# Patient Record
Sex: FEMALE | Race: WHITE | NOT HISPANIC OR LATINO
[De-identification: names, ages, dates, MRNs, and addresses within clinical notes are randomized per-mention and may not be internally consistent; named-entity substitution may affect disease eponyms.]

---

## 2021-09-14 ENCOUNTER — RX RENEWAL (OUTPATIENT)
Age: 76
End: 2021-09-14

## 2021-11-26 ENCOUNTER — RX RENEWAL (OUTPATIENT)
Age: 76
End: 2021-11-26

## 2022-04-26 ENCOUNTER — RX RENEWAL (OUTPATIENT)
Age: 77
End: 2022-04-26

## 2022-07-11 ENCOUNTER — RX RENEWAL (OUTPATIENT)
Age: 77
End: 2022-07-11

## 2022-07-21 ENCOUNTER — APPOINTMENT (OUTPATIENT)
Dept: INTERNAL MEDICINE | Facility: CLINIC | Age: 77
End: 2022-07-21

## 2022-07-21 ENCOUNTER — NON-APPOINTMENT (OUTPATIENT)
Age: 77
End: 2022-07-21

## 2022-07-21 VITALS
HEIGHT: 61 IN | OXYGEN SATURATION: 96 % | WEIGHT: 156 LBS | SYSTOLIC BLOOD PRESSURE: 134 MMHG | RESPIRATION RATE: 18 BRPM | BODY MASS INDEX: 29.45 KG/M2 | DIASTOLIC BLOOD PRESSURE: 82 MMHG | TEMPERATURE: 98.4 F | HEART RATE: 63 BPM

## 2022-07-21 DIAGNOSIS — L81.4 OTHER MELANIN HYPERPIGMENTATION: ICD-10-CM

## 2022-07-21 DIAGNOSIS — Z82.0 FAMILY HISTORY OF EPILEPSY AND OTHER DISEASES OF THE NERVOUS SYSTEM: ICD-10-CM

## 2022-07-21 DIAGNOSIS — Z86.19 PERSONAL HISTORY OF OTHER INFECTIOUS AND PARASITIC DISEASES: ICD-10-CM

## 2022-07-21 DIAGNOSIS — Z82.3 FAMILY HISTORY OF STROKE: ICD-10-CM

## 2022-07-21 DIAGNOSIS — Z82.49 FAMILY HISTORY OF ISCHEMIC HEART DISEASE AND OTHER DISEASES OF THE CIRCULATORY SYSTEM: ICD-10-CM

## 2022-07-21 PROCEDURE — G0439: CPT

## 2022-07-21 PROCEDURE — 93000 ELECTROCARDIOGRAM COMPLETE: CPT

## 2022-07-21 PROCEDURE — 99214 OFFICE O/P EST MOD 30 MIN: CPT | Mod: 25

## 2022-07-21 RX ORDER — CYCLOSPORINE 0.5 MG/ML
0.05 EMULSION OPHTHALMIC
Qty: 60 | Refills: 0 | Status: ACTIVE | COMMUNITY
Start: 2022-02-10

## 2022-07-21 NOTE — HEALTH RISK ASSESSMENT
[Good] : ~his/her~  mood as  good [Never] : Never [No] : In the past 12 months have you used drugs other than those required for medical reasons? No [No falls in past year] : Patient reported no falls in the past year [0] : 2) Feeling down, depressed, or hopeless: Not at all (0) [PHQ-2 Negative - No further assessment needed] : PHQ-2 Negative - No further assessment needed [Patient reported mammogram was normal] : Patient reported mammogram was normal [Patient reported bone density results were abnormal] : Patient reported bone density results were abnormal [Patient declined colonoscopy] : Patient declined colonoscopy [None] : None [With Significant Other] : lives with significant other [Retired] : retired [] :  [Feels Safe at Home] : Feels safe at home [Fully functional (bathing, dressing, toileting, transferring, walking, feeding)] : Fully functional (bathing, dressing, toileting, transferring, walking, feeding) [Fully functional (using the telephone, shopping, preparing meals, housekeeping, doing laundry, using] : Fully functional and needs no help or supervision to perform IADLs (using the telephone, shopping, preparing meals, housekeeping, doing laundry, using transportation, managing medications and managing finances) [Reports changes in vision] : Reports changes in vision [Audit-CScore] : 0 [de-identified] : active [de-identified] : healthy [[id number]] : 0 [Change in mental status noted] : No change in mental status noted [Language] : denies difficulty with language [Behavior] : denies difficulty with behavior [Handling Complex Tasks] : denies difficulty handling complex tasks [Reasoning] : denies difficulty with reasoning [Reports changes in hearing] : Reports no changes in hearing [Reports changes in dental health] : Reports no changes in dental health [MammogramDate] : 8/2021 [PapSmearDate] : ELEAZAR [BoneDensityDate] : 2021 [BoneDensityComments] : osteoperosis [de-identified] : sees optho [AdvancecareDate] : 7/21/22

## 2022-07-21 NOTE — ASSESSMENT
[FreeTextEntry1] : HLD- stable, cont atorv\par HTN-stable, cont metoprolol\par Osteoperosis- cont fosamax, ca/d, rpt BD 1/2022\par Dry Eyes- restasis, sees optho\par Mac Degen- injections q5w, sees optho\par Hypothyroid-stable, cont synthroid , chk labs\par OA- conserv tx. \par Discussed healthy lifestyle, updated vaccinations, healthy diet, exercise, chk labs, discussed appropriate screening tests including colonoscopy, mammo, bone density and pap.\par Discussed the importance of screening for colon cancer. Reviewed screening reccomendations including colonoscopy, Cologuard and Fit DNA testing. I strongly encouraged colonoscopy as that is the best screening test to detect both colon cancer and polyps and is the gold standard.\par Discussed the importance and benefit of a healthy lifestyle including a heart healthy diet such as the Mediterranean diet and regular exercise to try to lose weight as well as 7 hours of sleep nightly and minimization of stress where possible as a way of decreasing heart risk and improving health\par

## 2022-07-21 NOTE — HISTORY OF PRESENT ILLNESS
[FreeTextEntry1] : cristina [de-identified] : dry eyes\par mac degen- gets injections q5w- [facility]. \par moderna x2, no h/o covid\par oa hands, \par Otherwise feels good. Appet, sleep, bms, voiding, mood all ok. .\par

## 2022-07-21 NOTE — PHYSICAL EXAM
[No Acute Distress] : no acute distress [Well Nourished] : well nourished [Well Developed] : well developed [Well-Appearing] : well-appearing [Normal Sclera/Conjunctiva] : normal sclera/conjunctiva [PERRL] : pupils equal round and reactive to light [EOMI] : extraocular movements intact [Normal Outer Ear/Nose] : the outer ears and nose were normal in appearance [Normal Oropharynx] : the oropharynx was normal [No JVD] : no jugular venous distention [No Lymphadenopathy] : no lymphadenopathy [Supple] : supple [Thyroid Normal, No Nodules] : the thyroid was normal and there were no nodules present [No Respiratory Distress] : no respiratory distress  [No Accessory Muscle Use] : no accessory muscle use [Clear to Auscultation] : lungs were clear to auscultation bilaterally [Normal Rate] : normal rate  [Regular Rhythm] : with a regular rhythm [Normal S1, S2] : normal S1 and S2 [No Murmur] : no murmur heard [No Carotid Bruits] : no carotid bruits [No Abdominal Bruit] : a ~M bruit was not heard ~T in the abdomen [No Varicosities] : no varicosities [Pedal Pulses Present] : the pedal pulses are present [No Edema] : there was no peripheral edema [No Palpable Aorta] : no palpable aorta [No Extremity Clubbing/Cyanosis] : no extremity clubbing/cyanosis [Normal Appearance] : normal in appearance [No Nipple Discharge] : no nipple discharge [No Axillary Lymphadenopathy] : no axillary lymphadenopathy [Soft] : abdomen soft [Non Tender] : non-tender [Non-distended] : non-distended [No Masses] : no abdominal mass palpated [No HSM] : no HSM [Normal Bowel Sounds] : normal bowel sounds [Normal Posterior Cervical Nodes] : no posterior cervical lymphadenopathy [Normal Anterior Cervical Nodes] : no anterior cervical lymphadenopathy [No CVA Tenderness] : no CVA  tenderness [No Spinal Tenderness] : no spinal tenderness [Grossly Normal Strength/Tone] : grossly normal strength/tone [No Rash] : no rash [Coordination Grossly Intact] : coordination grossly intact [No Focal Deficits] : no focal deficits [Normal Gait] : normal gait [Deep Tendon Reflexes (DTR)] : deep tendon reflexes were 2+ and symmetric [Normal Affect] : the affect was normal [Normal Insight/Judgement] : insight and judgment were intact [de-identified] : diffuse arthritic changes hands/knees [de-identified] : numerous solar lentigines

## 2022-07-22 ENCOUNTER — NON-APPOINTMENT (OUTPATIENT)
Age: 77
End: 2022-07-22

## 2022-07-22 LAB
25(OH)D3 SERPL-MCNC: 42.4 NG/ML
ALBUMIN SERPL ELPH-MCNC: 4.4 G/DL
ALP BLD-CCNC: 74 U/L
ALT SERPL-CCNC: 13 U/L
ANION GAP SERPL CALC-SCNC: 11 MMOL/L
AST SERPL-CCNC: 28 U/L
BASOPHILS # BLD AUTO: 0.05 K/UL
BASOPHILS NFR BLD AUTO: 0.7 %
BILIRUB SERPL-MCNC: 0.4 MG/DL
BUN SERPL-MCNC: 17 MG/DL
CALCIUM SERPL-MCNC: 9.8 MG/DL
CHLORIDE SERPL-SCNC: 104 MMOL/L
CHOLEST SERPL-MCNC: 186 MG/DL
CO2 SERPL-SCNC: 26 MMOL/L
CREAT SERPL-MCNC: 0.68 MG/DL
EGFR: 90 ML/MIN/1.73M2
EOSINOPHIL # BLD AUTO: 0.13 K/UL
EOSINOPHIL NFR BLD AUTO: 1.9 %
ESTIMATED AVERAGE GLUCOSE: 117 MG/DL
GLUCOSE SERPL-MCNC: 97 MG/DL
HBA1C MFR BLD HPLC: 5.7 %
HCT VFR BLD CALC: 41.4 %
HDLC SERPL-MCNC: 73 MG/DL
HGB BLD-MCNC: 12.9 G/DL
IMM GRANULOCYTES NFR BLD AUTO: 0.1 %
LDLC SERPL CALC-MCNC: 95 MG/DL
LYMPHOCYTES # BLD AUTO: 1.98 K/UL
LYMPHOCYTES NFR BLD AUTO: 28.7 %
MAN DIFF?: NORMAL
MCHC RBC-ENTMCNC: 31.1 PG
MCHC RBC-ENTMCNC: 31.2 GM/DL
MCV RBC AUTO: 99.8 FL
MONOCYTES # BLD AUTO: 0.85 K/UL
MONOCYTES NFR BLD AUTO: 12.3 %
NEUTROPHILS # BLD AUTO: 3.89 K/UL
NEUTROPHILS NFR BLD AUTO: 56.3 %
NONHDLC SERPL-MCNC: 113 MG/DL
PLATELET # BLD AUTO: 283 K/UL
POTASSIUM SERPL-SCNC: 4.4 MMOL/L
PROT SERPL-MCNC: 7.1 G/DL
RBC # BLD: 4.15 M/UL
RBC # FLD: 13.1 %
SODIUM SERPL-SCNC: 141 MMOL/L
T3 SERPL-MCNC: 108 NG/DL
T4 FREE SERPL-MCNC: 1.2 NG/DL
TRIGL SERPL-MCNC: 88 MG/DL
TSH SERPL-ACNC: 2.3 UIU/ML
VIT B12 SERPL-MCNC: 631 PG/ML
WBC # FLD AUTO: 6.91 K/UL

## 2022-10-17 ENCOUNTER — APPOINTMENT (OUTPATIENT)
Dept: INTERNAL MEDICINE | Facility: CLINIC | Age: 77
End: 2022-10-17

## 2022-10-17 VITALS
WEIGHT: 140 LBS | HEIGHT: 61 IN | DIASTOLIC BLOOD PRESSURE: 100 MMHG | RESPIRATION RATE: 18 BRPM | SYSTOLIC BLOOD PRESSURE: 160 MMHG | BODY MASS INDEX: 26.43 KG/M2 | OXYGEN SATURATION: 96 % | TEMPERATURE: 98.7 F | HEART RATE: 104 BPM

## 2022-10-17 DIAGNOSIS — J01.90 ACUTE SINUSITIS, UNSPECIFIED: ICD-10-CM

## 2022-10-17 PROCEDURE — 99213 OFFICE O/P EST LOW 20 MIN: CPT

## 2022-10-18 ENCOUNTER — NON-APPOINTMENT (OUTPATIENT)
Age: 77
End: 2022-10-18

## 2022-10-18 PROBLEM — J01.90 ACUTE SINUSITIS: Status: RESOLVED | Noted: 2022-10-17 | Resolved: 2022-11-16

## 2022-10-18 LAB
RAPID RVP RESULT: DETECTED
RV+EV RNA SPEC QL NAA+PROBE: DETECTED
SARS-COV-2 RNA PNL RESP NAA+PROBE: NOT DETECTED

## 2022-10-18 NOTE — HISTORY OF PRESENT ILLNESS
[Patient presents to the office today for COVID-19 evaluation and testing.] : Patient presents to the office today for COVID-19 evaluation and testing. [] : no dizziness on standing [None Known] : none known [Age >= 60 years] : age >= 60 years [None] : none [Clear] : clear [Normal O2 sat at rest] : normal O2 sat at rest [Grossly normal, interacts, not tired or weak] : grossly normal, interacts, not tired or weak [COVID-19 testing ordered and specimen obtained] : COVID-19 testing ordered and specimen obtained [FreeTextEntry1] : 76 year old female presents herself today for a sick visit.\par Hx of \par \par Patient is here today for sinus pressure on her head.\par She is fully vaccinated for covid-19.\par Otherwise patient is stable.\par \par Voices no further complaints.\par ROS as documented below.\par Denies fever,cough,chills,body aches and SOB.

## 2022-11-30 ENCOUNTER — OFFICE (OUTPATIENT)
Dept: URBAN - METROPOLITAN AREA CLINIC 77 | Facility: CLINIC | Age: 77
Setting detail: OPHTHALMOLOGY
End: 2022-11-30
Payer: MEDICARE

## 2022-11-30 DIAGNOSIS — H35.3221: ICD-10-CM

## 2022-11-30 DIAGNOSIS — H40.1111: ICD-10-CM

## 2022-11-30 DIAGNOSIS — H35.3211: ICD-10-CM

## 2022-11-30 DIAGNOSIS — H35.3231: ICD-10-CM

## 2022-11-30 DIAGNOSIS — H43.811: ICD-10-CM

## 2022-11-30 PROCEDURE — 99213 OFFICE O/P EST LOW 20 MIN: CPT | Performed by: OPHTHALMOLOGY

## 2022-11-30 PROCEDURE — 67028 INJECTION EYE DRUG: CPT | Performed by: OPHTHALMOLOGY

## 2022-11-30 PROCEDURE — 92250 FUNDUS PHOTOGRAPHY W/I&R: CPT | Performed by: OPHTHALMOLOGY

## 2022-11-30 ASSESSMENT — SPHEQUIV_DERIVED
OS_SPHEQUIV: -1.75
OS_SPHEQUIV: -1.75
OD_SPHEQUIV: -1.375

## 2022-11-30 ASSESSMENT — REFRACTION_CURRENTRX
OD_SPHERE: +0.25
OS_CYLINDER: -0.75
OS_OVR_VA: 20/
OD_AXIS: 105
OD_CYLINDER: -1.25
OS_AXIS: 77
OS_ADD: +3.00
OD_ADD: +3.00
OD_OVR_VA: 20/
OS_SPHERE: -1.25

## 2022-11-30 ASSESSMENT — REFRACTION_MANIFEST
OD_CYLINDER: -1.25
OD_SPHERE: PL
OS_SPHERE: -1.25
OS_ADD: +3.00
OS_CYLINDER: -1.00
OD_VA1: 20/30
OD_ADD: +3.00
OS_AXIS: 80
OD_AXIS: 105
OS_VA1: 20/30

## 2022-11-30 ASSESSMENT — REFRACTION_AUTOREFRACTION
OS_SPHERE: -1.25
OD_AXIS: 105
OD_SPHERE: -0.75
OS_CYLINDER: -1.00
OD_CYLINDER: -1.25
OS_AXIS: 51

## 2022-11-30 ASSESSMENT — VISUAL ACUITY
OD_BCVA: 20/30+
OS_BCVA: 20/30-

## 2022-11-30 ASSESSMENT — TONOMETRY
OD_IOP_MMHG: 15
OS_IOP_MMHG: 12

## 2022-11-30 ASSESSMENT — CONFRONTATIONAL VISUAL FIELD TEST (CVF)
OD_FINDINGS: FULL
OS_FINDINGS: FULL

## 2022-12-12 ENCOUNTER — OFFICE (OUTPATIENT)
Dept: URBAN - METROPOLITAN AREA CLINIC 77 | Facility: CLINIC | Age: 77
Setting detail: OPHTHALMOLOGY
End: 2022-12-12
Payer: MEDICARE

## 2022-12-12 DIAGNOSIS — H35.3221: ICD-10-CM

## 2022-12-12 DIAGNOSIS — H40.1111: ICD-10-CM

## 2022-12-12 DIAGNOSIS — H35.3211: ICD-10-CM

## 2022-12-12 PROCEDURE — 99213 OFFICE O/P EST LOW 20 MIN: CPT | Performed by: OPHTHALMOLOGY

## 2022-12-12 PROCEDURE — 92134 CPTRZ OPH DX IMG PST SGM RTA: CPT | Performed by: OPHTHALMOLOGY

## 2022-12-12 ASSESSMENT — KERATOMETRY
OD_K2POWER_DIOPTERS: 47.00
OD_AXISANGLE_DEGREES: 34
OD_K1POWER_DIOPTERS: 45.75
OS_K1POWER_DIOPTERS: 46.25
OS_K2POWER_DIOPTERS: 47.25
OS_AXISANGLE_DEGREES: 134

## 2022-12-12 ASSESSMENT — REFRACTION_AUTOREFRACTION
OS_AXIS: 51
OD_CYLINDER: -1.25
OD_SPHERE: -0.75
OD_AXIS: 105
OS_SPHERE: -1.25
OS_CYLINDER: -1.00

## 2022-12-12 ASSESSMENT — REFRACTION_CURRENTRX
OS_OVR_VA: 20/
OS_ADD: +3.00
OD_OVR_VA: 20/
OD_ADD: +3.00
OD_CYLINDER: -1.25
OS_AXIS: 77
OD_AXIS: 105
OS_SPHERE: -1.25
OS_CYLINDER: -0.75
OD_SPHERE: +0.25

## 2022-12-12 ASSESSMENT — AXIALLENGTH_DERIVED
OS_AL: 23.0908
OS_AL: 23.0908
OD_AL: 23.0828

## 2022-12-12 ASSESSMENT — REFRACTION_MANIFEST
OD_SPHERE: PL
OD_CYLINDER: -1.25
OS_CYLINDER: -1.00
OS_ADD: +3.00
OS_VA1: 20/30
OD_ADD: +3.00
OS_AXIS: 80
OD_VA1: 20/30
OD_AXIS: 105
OS_SPHERE: -1.25

## 2022-12-12 ASSESSMENT — VISUAL ACUITY
OD_BCVA: 20/30
OS_BCVA: 20/30-

## 2022-12-12 ASSESSMENT — CONFRONTATIONAL VISUAL FIELD TEST (CVF)
OD_FINDINGS: FULL
OS_FINDINGS: FULL

## 2022-12-12 ASSESSMENT — SPHEQUIV_DERIVED
OD_SPHEQUIV: -1.375
OS_SPHEQUIV: -1.75
OS_SPHEQUIV: -1.75

## 2022-12-27 ENCOUNTER — OFFICE (OUTPATIENT)
Dept: URBAN - METROPOLITAN AREA CLINIC 77 | Facility: CLINIC | Age: 77
Setting detail: OPHTHALMOLOGY
End: 2022-12-27
Payer: MEDICARE

## 2022-12-27 DIAGNOSIS — H35.3211: ICD-10-CM

## 2022-12-27 DIAGNOSIS — H35.3221: ICD-10-CM

## 2022-12-27 DIAGNOSIS — H40.1111: ICD-10-CM

## 2022-12-27 DIAGNOSIS — H35.3231: ICD-10-CM

## 2022-12-27 DIAGNOSIS — H43.811: ICD-10-CM

## 2022-12-27 PROCEDURE — 92250 FUNDUS PHOTOGRAPHY W/I&R: CPT | Performed by: OPHTHALMOLOGY

## 2022-12-27 PROCEDURE — 67028 INJECTION EYE DRUG: CPT | Performed by: OPHTHALMOLOGY

## 2022-12-27 ASSESSMENT — CONFRONTATIONAL VISUAL FIELD TEST (CVF)
OD_FINDINGS: FULL
OS_FINDINGS: FULL

## 2022-12-27 ASSESSMENT — REFRACTION_CURRENTRX
OS_CYLINDER: -0.75
OS_SPHERE: -1.25
OS_AXIS: 77
OS_ADD: +3.00
OS_OVR_VA: 20/
OD_OVR_VA: 20/
OD_SPHERE: +0.25
OD_AXIS: 105
OD_CYLINDER: -1.25
OD_ADD: +3.00

## 2022-12-27 ASSESSMENT — REFRACTION_MANIFEST
OD_CYLINDER: -1.25
OD_ADD: +3.00
OS_AXIS: 80
OS_VA1: 20/30
OD_VA1: 20/30
OS_CYLINDER: -1.00
OD_AXIS: 105
OD_SPHERE: PL
OS_ADD: +3.00
OS_SPHERE: -1.25

## 2022-12-27 ASSESSMENT — VISUAL ACUITY
OD_BCVA: 20/30
OS_BCVA: 20/30-

## 2022-12-27 ASSESSMENT — AXIALLENGTH_DERIVED
OS_AL: 23.0908
OD_AL: 23.0828
OS_AL: 23.0908

## 2022-12-27 ASSESSMENT — KERATOMETRY
OS_AXISANGLE_DEGREES: 134
OS_K1POWER_DIOPTERS: 46.25
OD_AXISANGLE_DEGREES: 34
OD_K1POWER_DIOPTERS: 45.75
OS_K2POWER_DIOPTERS: 47.25
OD_K2POWER_DIOPTERS: 47.00

## 2022-12-27 ASSESSMENT — SPHEQUIV_DERIVED
OS_SPHEQUIV: -1.75
OD_SPHEQUIV: -1.375
OS_SPHEQUIV: -1.75

## 2022-12-27 ASSESSMENT — REFRACTION_AUTOREFRACTION
OS_AXIS: 51
OD_AXIS: 105
OS_SPHERE: -1.25
OD_SPHERE: -0.75
OS_CYLINDER: -1.00
OD_CYLINDER: -1.25

## 2023-02-08 ENCOUNTER — OFFICE (OUTPATIENT)
Dept: URBAN - METROPOLITAN AREA CLINIC 77 | Facility: CLINIC | Age: 78
Setting detail: OPHTHALMOLOGY
End: 2023-02-08
Payer: MEDICARE

## 2023-02-08 DIAGNOSIS — H35.3211: ICD-10-CM

## 2023-02-08 DIAGNOSIS — H35.3221: ICD-10-CM

## 2023-02-08 DIAGNOSIS — H40.1111: ICD-10-CM

## 2023-02-08 PROCEDURE — 67028 INJECTION EYE DRUG: CPT | Performed by: OPHTHALMOLOGY

## 2023-02-08 PROCEDURE — 99213 OFFICE O/P EST LOW 20 MIN: CPT | Performed by: OPHTHALMOLOGY

## 2023-02-08 PROCEDURE — 92250 FUNDUS PHOTOGRAPHY W/I&R: CPT | Performed by: OPHTHALMOLOGY

## 2023-02-08 ASSESSMENT — CONFRONTATIONAL VISUAL FIELD TEST (CVF)
OD_FINDINGS: FULL
OS_FINDINGS: FULL

## 2023-02-16 ASSESSMENT — REFRACTION_MANIFEST
OS_VA1: 20/30
OS_SPHERE: -1.25
OS_ADD: +3.00
OD_AXIS: 105
OD_ADD: +3.00
OD_SPHERE: PL
OD_VA1: 20/30
OS_CYLINDER: -1.00
OS_AXIS: 80
OD_CYLINDER: -1.25

## 2023-02-16 ASSESSMENT — KERATOMETRY
OS_AXISANGLE_DEGREES: 134
OS_K2POWER_DIOPTERS: 47.25
OS_K1POWER_DIOPTERS: 46.25
OD_AXISANGLE_DEGREES: 34
OD_K2POWER_DIOPTERS: 47.00
OD_K1POWER_DIOPTERS: 45.75

## 2023-02-16 ASSESSMENT — REFRACTION_CURRENTRX
OD_SPHERE: +0.25
OD_ADD: +3.00
OD_OVR_VA: 20/
OS_SPHERE: -1.25
OS_OVR_VA: 20/
OD_CYLINDER: -1.25
OS_AXIS: 77
OD_AXIS: 105
OS_CYLINDER: -0.75
OS_ADD: +3.00

## 2023-02-16 ASSESSMENT — SPHEQUIV_DERIVED
OS_SPHEQUIV: -1.75
OS_SPHEQUIV: -1.75
OD_SPHEQUIV: -1.375

## 2023-02-16 ASSESSMENT — REFRACTION_AUTOREFRACTION
OD_AXIS: 105
OD_SPHERE: -0.75
OS_CYLINDER: -1.00
OS_AXIS: 51
OS_SPHERE: -1.25
OD_CYLINDER: -1.25

## 2023-02-16 ASSESSMENT — AXIALLENGTH_DERIVED
OS_AL: 23.0908
OD_AL: 23.0828
OS_AL: 23.0908

## 2023-02-16 ASSESSMENT — VISUAL ACUITY
OD_BCVA: 20/30
OS_BCVA: 20/30-

## 2023-03-01 ENCOUNTER — RX RENEWAL (OUTPATIENT)
Age: 78
End: 2023-03-01

## 2023-03-23 ENCOUNTER — OFFICE (OUTPATIENT)
Dept: URBAN - METROPOLITAN AREA CLINIC 77 | Facility: CLINIC | Age: 78
Setting detail: OPHTHALMOLOGY
End: 2023-03-23
Payer: MEDICARE

## 2023-03-23 DIAGNOSIS — H35.3211: ICD-10-CM

## 2023-03-23 DIAGNOSIS — H43.811: ICD-10-CM

## 2023-03-23 DIAGNOSIS — H35.3231: ICD-10-CM

## 2023-03-23 DIAGNOSIS — H25.13: ICD-10-CM

## 2023-03-23 DIAGNOSIS — H35.3221: ICD-10-CM

## 2023-03-23 DIAGNOSIS — H40.1111: ICD-10-CM

## 2023-03-23 PROCEDURE — 67028 INJECTION EYE DRUG: CPT | Performed by: OPHTHALMOLOGY

## 2023-03-23 PROCEDURE — 92250 FUNDUS PHOTOGRAPHY W/I&R: CPT | Performed by: OPHTHALMOLOGY

## 2023-03-23 PROCEDURE — 99213 OFFICE O/P EST LOW 20 MIN: CPT | Performed by: OPHTHALMOLOGY

## 2023-03-23 ASSESSMENT — REFRACTION_AUTOREFRACTION
OS_CYLINDER: -1.00
OD_AXIS: 105
OD_SPHERE: -0.75
OS_SPHERE: -1.25
OS_AXIS: 51
OD_CYLINDER: -1.25

## 2023-03-23 ASSESSMENT — SPHEQUIV_DERIVED
OS_SPHEQUIV: -1.75
OS_SPHEQUIV: -1.75
OD_SPHEQUIV: -1.375

## 2023-03-23 ASSESSMENT — REFRACTION_MANIFEST
OS_AXIS: 80
OD_CYLINDER: -1.25
OS_SPHERE: -1.25
OS_CYLINDER: -1.00
OD_VA1: 20/30
OS_ADD: +3.00
OD_AXIS: 105
OD_ADD: +3.00
OS_VA1: 20/30
OD_SPHERE: PL

## 2023-03-23 ASSESSMENT — REFRACTION_CURRENTRX
OS_CYLINDER: -0.75
OD_CYLINDER: -1.25
OD_OVR_VA: 20/
OD_ADD: +3.00
OD_SPHERE: +0.25
OD_AXIS: 105
OS_AXIS: 77
OS_OVR_VA: 20/
OS_ADD: +3.00
OS_SPHERE: -1.25

## 2023-03-23 ASSESSMENT — KERATOMETRY
OD_K1POWER_DIOPTERS: 45.75
OD_K2POWER_DIOPTERS: 47.00
OS_K1POWER_DIOPTERS: 46.25
OD_AXISANGLE_DEGREES: 34
OS_K2POWER_DIOPTERS: 47.25
OS_AXISANGLE_DEGREES: 134

## 2023-03-23 ASSESSMENT — AXIALLENGTH_DERIVED
OS_AL: 23.0908
OS_AL: 23.0908
OD_AL: 23.0828

## 2023-03-23 ASSESSMENT — VISUAL ACUITY
OS_BCVA: 20/30-
OD_BCVA: 20/30

## 2023-03-23 ASSESSMENT — CONFRONTATIONAL VISUAL FIELD TEST (CVF)
OS_FINDINGS: FULL
OD_FINDINGS: FULL

## 2023-04-24 ENCOUNTER — OFFICE (OUTPATIENT)
Dept: URBAN - METROPOLITAN AREA CLINIC 77 | Facility: CLINIC | Age: 78
Setting detail: OPHTHALMOLOGY
End: 2023-04-24
Payer: MEDICARE

## 2023-04-24 DIAGNOSIS — H35.3231: ICD-10-CM

## 2023-04-24 DIAGNOSIS — H43.391: ICD-10-CM

## 2023-04-24 DIAGNOSIS — H40.1111: ICD-10-CM

## 2023-04-24 DIAGNOSIS — H43.811: ICD-10-CM

## 2023-04-24 DIAGNOSIS — H25.13: ICD-10-CM

## 2023-04-24 PROCEDURE — 92250 FUNDUS PHOTOGRAPHY W/I&R: CPT | Performed by: OPHTHALMOLOGY

## 2023-04-24 PROCEDURE — 76514 ECHO EXAM OF EYE THICKNESS: CPT | Performed by: OPHTHALMOLOGY

## 2023-04-24 PROCEDURE — 99213 OFFICE O/P EST LOW 20 MIN: CPT | Performed by: OPHTHALMOLOGY

## 2023-04-24 PROCEDURE — 67028 INJECTION EYE DRUG: CPT | Performed by: OPHTHALMOLOGY

## 2023-04-24 ASSESSMENT — REFRACTION_AUTOREFRACTION
OS_AXIS: 51
OD_AXIS: 105
OD_CYLINDER: -1.25
OS_SPHERE: -1.25
OS_CYLINDER: -1.00
OD_SPHERE: -0.75

## 2023-04-24 ASSESSMENT — KERATOMETRY
OD_K1POWER_DIOPTERS: 45.75
OS_AXISANGLE_DEGREES: 134
OD_AXISANGLE_DEGREES: 34
OS_K1POWER_DIOPTERS: 46.25
OD_K2POWER_DIOPTERS: 47.00
OS_K2POWER_DIOPTERS: 47.25

## 2023-04-24 ASSESSMENT — REFRACTION_CURRENTRX
OD_SPHERE: +0.25
OD_ADD: +3.00
OS_OVR_VA: 20/
OD_OVR_VA: 20/
OS_AXIS: 77
OS_SPHERE: -1.25
OD_AXIS: 105
OS_ADD: +3.00
OS_CYLINDER: -0.75
OD_CYLINDER: -1.25

## 2023-04-24 ASSESSMENT — CONFRONTATIONAL VISUAL FIELD TEST (CVF)
OS_FINDINGS: FULL
OD_FINDINGS: FULL

## 2023-04-24 ASSESSMENT — AXIALLENGTH_DERIVED
OD_AL: 23.0828
OS_AL: 23.0908
OS_AL: 23.0908

## 2023-04-24 ASSESSMENT — REFRACTION_MANIFEST
OS_ADD: +3.00
OS_CYLINDER: -1.00
OD_VA1: 20/30
OD_SPHERE: PL
OD_ADD: +3.00
OD_AXIS: 105
OD_CYLINDER: -1.25
OS_SPHERE: -1.25
OS_VA1: 20/30
OS_AXIS: 80

## 2023-04-24 ASSESSMENT — SPHEQUIV_DERIVED
OS_SPHEQUIV: -1.75
OS_SPHEQUIV: -1.75
OD_SPHEQUIV: -1.375

## 2023-04-24 ASSESSMENT — VISUAL ACUITY
OS_BCVA: 20/30-
OD_BCVA: 20/30

## 2023-06-12 ENCOUNTER — OFFICE (OUTPATIENT)
Dept: URBAN - METROPOLITAN AREA CLINIC 77 | Facility: CLINIC | Age: 78
Setting detail: OPHTHALMOLOGY
End: 2023-06-12
Payer: MEDICARE

## 2023-06-12 DIAGNOSIS — H43.391: ICD-10-CM

## 2023-06-12 DIAGNOSIS — H35.3231: ICD-10-CM

## 2023-06-12 DIAGNOSIS — H35.3211: ICD-10-CM

## 2023-06-12 DIAGNOSIS — H43.811: ICD-10-CM

## 2023-06-12 DIAGNOSIS — H35.3221: ICD-10-CM

## 2023-06-12 DIAGNOSIS — H25.13: ICD-10-CM

## 2023-06-12 DIAGNOSIS — H40.1111: ICD-10-CM

## 2023-06-12 PROCEDURE — 99213 OFFICE O/P EST LOW 20 MIN: CPT | Performed by: OPHTHALMOLOGY

## 2023-06-12 PROCEDURE — 67028 INJECTION EYE DRUG: CPT | Performed by: OPHTHALMOLOGY

## 2023-06-12 PROCEDURE — 92250 FUNDUS PHOTOGRAPHY W/I&R: CPT | Performed by: OPHTHALMOLOGY

## 2023-06-12 ASSESSMENT — REFRACTION_CURRENTRX
OD_ADD: +3.00
OS_CYLINDER: -0.75
OD_CYLINDER: -1.25
OD_OVR_VA: 20/
OS_AXIS: 77
OS_ADD: +3.00
OD_SPHERE: +0.25
OS_SPHERE: -1.25
OD_AXIS: 105
OS_OVR_VA: 20/

## 2023-06-12 ASSESSMENT — REFRACTION_MANIFEST
OS_AXIS: 80
OD_AXIS: 105
OD_ADD: +3.00
OS_CYLINDER: -1.00
OS_VA1: 20/30
OD_CYLINDER: -1.25
OD_SPHERE: PL
OS_ADD: +3.00
OD_VA1: 20/30
OS_SPHERE: -1.25

## 2023-06-12 ASSESSMENT — KERATOMETRY
OS_K2POWER_DIOPTERS: 47.25
OD_K1POWER_DIOPTERS: 45.75
OD_AXISANGLE_DEGREES: 34
OS_AXISANGLE_DEGREES: 134
OD_K2POWER_DIOPTERS: 47.00
OS_K1POWER_DIOPTERS: 46.25

## 2023-06-12 ASSESSMENT — SPHEQUIV_DERIVED
OS_SPHEQUIV: -1.75
OD_SPHEQUIV: -1.375
OS_SPHEQUIV: -1.75

## 2023-06-12 ASSESSMENT — REFRACTION_AUTOREFRACTION
OD_CYLINDER: -1.25
OS_AXIS: 51
OD_AXIS: 105
OS_SPHERE: -1.25
OD_SPHERE: -0.75
OS_CYLINDER: -1.00

## 2023-06-12 ASSESSMENT — AXIALLENGTH_DERIVED
OD_AL: 23.0828
OS_AL: 23.0908
OS_AL: 23.0908

## 2023-06-12 ASSESSMENT — CONFRONTATIONAL VISUAL FIELD TEST (CVF)
OD_FINDINGS: FULL
OS_FINDINGS: FULL

## 2023-06-12 ASSESSMENT — VISUAL ACUITY
OD_BCVA: 20/30
OS_BCVA: 20/30-

## 2023-06-23 ENCOUNTER — RX RENEWAL (OUTPATIENT)
Age: 78
End: 2023-06-23

## 2023-07-03 ENCOUNTER — RX RENEWAL (OUTPATIENT)
Age: 78
End: 2023-07-03

## 2023-07-03 RX ORDER — METOPROLOL SUCCINATE 50 MG/1
50 TABLET, EXTENDED RELEASE ORAL
Qty: 90 | Refills: 3 | Status: ACTIVE | COMMUNITY
Start: 2022-04-18 | End: 1900-01-01

## 2023-07-03 RX ORDER — ATORVASTATIN CALCIUM 20 MG/1
20 TABLET, FILM COATED ORAL
Qty: 90 | Refills: 3 | Status: ACTIVE | COMMUNITY
Start: 2022-04-18 | End: 1900-01-01

## 2023-07-17 ENCOUNTER — OFFICE (OUTPATIENT)
Dept: URBAN - METROPOLITAN AREA CLINIC 77 | Facility: CLINIC | Age: 78
Setting detail: OPHTHALMOLOGY
End: 2023-07-17
Payer: MEDICARE

## 2023-07-17 DIAGNOSIS — H35.3221: ICD-10-CM

## 2023-07-17 DIAGNOSIS — H40.1111: ICD-10-CM

## 2023-07-17 DIAGNOSIS — H43.811: ICD-10-CM

## 2023-07-17 DIAGNOSIS — H25.13: ICD-10-CM

## 2023-07-17 DIAGNOSIS — H35.3211: ICD-10-CM

## 2023-07-17 DIAGNOSIS — H35.3231: ICD-10-CM

## 2023-07-17 PROCEDURE — 67028 INJECTION EYE DRUG: CPT | Performed by: OPHTHALMOLOGY

## 2023-07-17 PROCEDURE — 92250 FUNDUS PHOTOGRAPHY W/I&R: CPT | Performed by: OPHTHALMOLOGY

## 2023-07-17 PROCEDURE — 99213 OFFICE O/P EST LOW 20 MIN: CPT | Performed by: OPHTHALMOLOGY

## 2023-07-17 ASSESSMENT — KERATOMETRY
OD_K1POWER_DIOPTERS: 45.75
OD_K2POWER_DIOPTERS: 47.00
OS_K1POWER_DIOPTERS: 46.25
OS_AXISANGLE_DEGREES: 134
OS_K2POWER_DIOPTERS: 47.25
OD_AXISANGLE_DEGREES: 34

## 2023-07-17 ASSESSMENT — REFRACTION_CURRENTRX
OD_CYLINDER: -1.25
OS_ADD: +3.00
OD_OVR_VA: 20/
OS_OVR_VA: 20/
OS_SPHERE: -1.25
OD_SPHERE: +0.25
OD_AXIS: 105
OS_AXIS: 77
OD_ADD: +3.00
OS_CYLINDER: -0.75

## 2023-07-17 ASSESSMENT — AXIALLENGTH_DERIVED
OS_AL: 23.0908
OD_AL: 23.0828
OS_AL: 23.0908

## 2023-07-17 ASSESSMENT — VISUAL ACUITY
OD_BCVA: 20/30
OS_BCVA: 20/30-

## 2023-07-17 ASSESSMENT — CONFRONTATIONAL VISUAL FIELD TEST (CVF)
OD_FINDINGS: FULL
OS_FINDINGS: FULL

## 2023-07-17 ASSESSMENT — REFRACTION_MANIFEST
OS_AXIS: 80
OD_SPHERE: PL
OD_AXIS: 105
OS_VA1: 20/30
OD_CYLINDER: -1.25
OS_CYLINDER: -1.00
OD_VA1: 20/30
OS_SPHERE: -1.25
OS_ADD: +3.00
OD_ADD: +3.00

## 2023-07-17 ASSESSMENT — REFRACTION_AUTOREFRACTION
OS_AXIS: 51
OD_AXIS: 105
OS_CYLINDER: -1.00
OS_SPHERE: -1.25
OD_SPHERE: -0.75
OD_CYLINDER: -1.25

## 2023-07-17 ASSESSMENT — SPHEQUIV_DERIVED
OS_SPHEQUIV: -1.75
OD_SPHEQUIV: -1.375
OS_SPHEQUIV: -1.75

## 2023-07-27 ENCOUNTER — NON-APPOINTMENT (OUTPATIENT)
Age: 78
End: 2023-07-27

## 2023-07-27 ENCOUNTER — APPOINTMENT (OUTPATIENT)
Dept: INTERNAL MEDICINE | Facility: CLINIC | Age: 78
End: 2023-07-27
Payer: MEDICARE

## 2023-07-27 VITALS
TEMPERATURE: 98 F | RESPIRATION RATE: 18 BRPM | HEIGHT: 61 IN | HEART RATE: 77 BPM | SYSTOLIC BLOOD PRESSURE: 134 MMHG | WEIGHT: 160.4 LBS | DIASTOLIC BLOOD PRESSURE: 88 MMHG | BODY MASS INDEX: 30.29 KG/M2 | OXYGEN SATURATION: 97 %

## 2023-07-27 DIAGNOSIS — Z00.00 ENCOUNTER FOR GENERAL ADULT MEDICAL EXAMINATION W/OUT ABNORMAL FINDINGS: ICD-10-CM

## 2023-07-27 PROCEDURE — G0439: CPT

## 2023-07-27 PROCEDURE — 99214 OFFICE O/P EST MOD 30 MIN: CPT | Mod: 25

## 2023-07-27 PROCEDURE — 93000 ELECTROCARDIOGRAM COMPLETE: CPT

## 2023-07-27 RX ORDER — AMOXICILLIN AND CLAVULANATE POTASSIUM 875; 125 MG/1; MG/1
875-125 TABLET, COATED ORAL
Qty: 14 | Refills: 0 | Status: DISCONTINUED | COMMUNITY
Start: 2022-10-17 | End: 2023-07-27

## 2023-07-28 LAB
25(OH)D3 SERPL-MCNC: 37.7 NG/ML
ALBUMIN SERPL ELPH-MCNC: 4.7 G/DL
ALP BLD-CCNC: 80 U/L
ALT SERPL-CCNC: 16 U/L
ANION GAP SERPL CALC-SCNC: 12 MMOL/L
APPEARANCE: CLEAR
AST SERPL-CCNC: 26 U/L
BACTERIA: NEGATIVE /HPF
BILIRUB SERPL-MCNC: 0.7 MG/DL
BILIRUBIN URINE: NEGATIVE
BLOOD URINE: NEGATIVE
BUN SERPL-MCNC: 12 MG/DL
CALCIUM SERPL-MCNC: 9.7 MG/DL
CAST: 0 /LPF
CHLORIDE SERPL-SCNC: 104 MMOL/L
CHOLEST SERPL-MCNC: 196 MG/DL
CO2 SERPL-SCNC: 26 MMOL/L
COLOR: YELLOW
CREAT SERPL-MCNC: 0.61 MG/DL
EGFR: 91 ML/MIN/1.73M2
EPITHELIAL CELLS: 1 /HPF
ESTIMATED AVERAGE GLUCOSE: 120 MG/DL
GLUCOSE QUALITATIVE U: NEGATIVE MG/DL
GLUCOSE SERPL-MCNC: 86 MG/DL
HBA1C MFR BLD HPLC: 5.8 %
HDLC SERPL-MCNC: 64 MG/DL
KETONES URINE: NEGATIVE MG/DL
LDLC SERPL CALC-MCNC: 90 MG/DL
LEUKOCYTE ESTERASE URINE: ABNORMAL
MICROSCOPIC-UA: NORMAL
NITRITE URINE: NEGATIVE
NONHDLC SERPL-MCNC: 132 MG/DL
PH URINE: 6.5
POTASSIUM SERPL-SCNC: 4.8 MMOL/L
PROT SERPL-MCNC: 7.5 G/DL
PROTEIN URINE: NEGATIVE MG/DL
RED BLOOD CELLS URINE: 0 /HPF
SODIUM SERPL-SCNC: 142 MMOL/L
SPECIFIC GRAVITY URINE: 1.01
T3 SERPL-MCNC: 107 NG/DL
T4 FREE SERPL-MCNC: 1.1 NG/DL
TRIGL SERPL-MCNC: 255 MG/DL
TSH SERPL-ACNC: 8.05 UIU/ML
UROBILINOGEN URINE: 0.2 MG/DL
VIT B12 SERPL-MCNC: 616 PG/ML
WHITE BLOOD CELLS URINE: 0 /HPF

## 2023-07-30 PROBLEM — Z00.00 ENCOUNTER FOR PREVENTIVE HEALTH EXAMINATION: Status: ACTIVE | Noted: 2021-07-18

## 2023-07-30 NOTE — ASSESSMENT
[FreeTextEntry1] : HLD- stable, cont atorv HTN-stable, cont metoprolol Osteoperosis- cont fosamax, ca/d Dry Eyes- restasis, sees optho Mac Degen- injections q5w, sees optho Hypothyroid-stable, cont synthroid , chk labs OA- conserv tx.  Discussed healthy lifestyle, updated vaccinations, healthy diet, exercise, chk labs, discussed appropriate screening tests including colonoscopy, mammo, bone density and pap. Discussed the importance of screening for colon cancer. Reviewed screening reccomendations including colonoscopy, Cologuard and Fit DNA testing. I strongly encouraged colonoscopy as that is the best screening test to detect both colon cancer and polyps and is the gold standard. Discussed the importance and benefit of a healthy lifestyle including a heart healthy diet such as the Mediterranean diet and regular exercise to try to lose weight as well as 7 hours of sleep nightly and minimization of stress where possible as a way of decreasing heart risk and improving health

## 2023-07-30 NOTE — HEALTH RISK ASSESSMENT
[Good] : ~his/her~  mood as  good [1 or 2 (0 pts)] : 1 or 2 (0 points) [Never (0 pts)] : Never (0 points) [No] : In the past 12 months have you used drugs other than those required for medical reasons? No [No falls in past year] : Patient reported no falls in the past year [0] : 2) Feeling down, depressed, or hopeless: Not at all (0) [PHQ-2 Negative - No further assessment needed] : PHQ-2 Negative - No further assessment needed [Patient declined PAP Smear] : Patient declined PAP Smear [Patient declined colonoscopy] : Patient declined colonoscopy [None] : None [With Significant Other] : lives with significant other [Retired] : retired [] :  [Fully functional (bathing, dressing, toileting, transferring, walking, feeding)] : Fully functional (bathing, dressing, toileting, transferring, walking, feeding) [Fully functional (using the telephone, shopping, preparing meals, housekeeping, doing laundry, using] : Fully functional and needs no help or supervision to perform IADLs (using the telephone, shopping, preparing meals, housekeeping, doing laundry, using transportation, managing medications and managing finances) [Reports changes in vision] : Reports changes in vision [Never] : Never [Audit-CScore] : 0 [de-identified] : no exc [[id number]] : 0 [de-identified] : healthy [Change in mental status noted] : No change in mental status noted [Language] : denies difficulty with language [Behavior] : denies difficulty with behavior [Handling Complex Tasks] : denies difficulty handling complex tasks [Reasoning] : denies difficulty with reasoning [Reports changes in dental health] : Reports no changes in dental health [Reports changes in hearing] : Reports no changes in hearing [AdvancecareDate] : 7/27/23

## 2023-07-30 NOTE — PHYSICAL EXAM
[No Acute Distress] : no acute distress [Well Nourished] : well nourished [Well Developed] : well developed [Well-Appearing] : well-appearing [Normal Sclera/Conjunctiva] : normal sclera/conjunctiva [PERRL] : pupils equal round and reactive to light [EOMI] : extraocular movements intact [Normal Outer Ear/Nose] : the outer ears and nose were normal in appearance [Normal Oropharynx] : the oropharynx was normal [No JVD] : no jugular venous distention [No Lymphadenopathy] : no lymphadenopathy [Supple] : supple [Thyroid Normal, No Nodules] : the thyroid was normal and there were no nodules present [No Respiratory Distress] : no respiratory distress  [No Accessory Muscle Use] : no accessory muscle use [Clear to Auscultation] : lungs were clear to auscultation bilaterally [Normal Rate] : normal rate  [Regular Rhythm] : with a regular rhythm [Normal S1, S2] : normal S1 and S2 [No Carotid Bruits] : no carotid bruits [No Murmur] : no murmur heard [No Abdominal Bruit] : a ~M bruit was not heard ~T in the abdomen [No Varicosities] : no varicosities [Pedal Pulses Present] : the pedal pulses are present [No Edema] : there was no peripheral edema [No Palpable Aorta] : no palpable aorta [No Extremity Clubbing/Cyanosis] : no extremity clubbing/cyanosis [Normal Appearance] : normal in appearance [No Nipple Discharge] : no nipple discharge [No Axillary Lymphadenopathy] : no axillary lymphadenopathy [Soft] : abdomen soft [Non Tender] : non-tender [Non-distended] : non-distended [No Masses] : no abdominal mass palpated [No HSM] : no HSM [Normal Bowel Sounds] : normal bowel sounds [Normal Posterior Cervical Nodes] : no posterior cervical lymphadenopathy [Normal Anterior Cervical Nodes] : no anterior cervical lymphadenopathy [No CVA Tenderness] : no CVA  tenderness [No Spinal Tenderness] : no spinal tenderness [No Joint Swelling] : no joint swelling [No Rash] : no rash [Grossly Normal Strength/Tone] : grossly normal strength/tone [Coordination Grossly Intact] : coordination grossly intact [No Focal Deficits] : no focal deficits [Normal Gait] : normal gait [Deep Tendon Reflexes (DTR)] : deep tendon reflexes were 2+ and symmetric [Normal Insight/Judgement] : insight and judgment were intact [Normal Affect] : the affect was normal

## 2023-07-30 NOTE — HISTORY OF PRESENT ILLNESS
[de-identified] : mac degen, seeing optho. getting injections.  decl pneumovax , shingrix.  Otherwise feels good. Appet, sleep, bms, voiding, mood all ok. no pain. Reviewed all interim as well as relevant prior consultations, labs and radiological studies. [FreeTextEntry1] : cristina

## 2023-07-31 LAB — BACTERIA UR CULT: NORMAL

## 2023-09-11 ENCOUNTER — OFFICE (OUTPATIENT)
Dept: URBAN - METROPOLITAN AREA CLINIC 77 | Facility: CLINIC | Age: 78
Setting detail: OPHTHALMOLOGY
End: 2023-09-11
Payer: MEDICARE

## 2023-09-11 DIAGNOSIS — H35.3231: ICD-10-CM

## 2023-09-11 DIAGNOSIS — H25.13: ICD-10-CM

## 2023-09-11 DIAGNOSIS — H43.391: ICD-10-CM

## 2023-09-11 DIAGNOSIS — H35.3221: ICD-10-CM

## 2023-09-11 DIAGNOSIS — H35.3211: ICD-10-CM

## 2023-09-11 DIAGNOSIS — H43.811: ICD-10-CM

## 2023-09-11 DIAGNOSIS — H40.1131: ICD-10-CM

## 2023-09-11 PROCEDURE — 67028 INJECTION EYE DRUG: CPT | Performed by: OPHTHALMOLOGY

## 2023-09-11 PROCEDURE — 92250 FUNDUS PHOTOGRAPHY W/I&R: CPT | Performed by: OPHTHALMOLOGY

## 2023-09-11 PROCEDURE — 99213 OFFICE O/P EST LOW 20 MIN: CPT | Performed by: OPHTHALMOLOGY

## 2023-09-11 ASSESSMENT — SPHEQUIV_DERIVED
OS_SPHEQUIV: -1.75
OD_SPHEQUIV: -1.375
OS_SPHEQUIV: -1.75

## 2023-09-11 ASSESSMENT — REFRACTION_MANIFEST
OS_SPHERE: -1.25
OS_ADD: +3.00
OD_CYLINDER: -1.25
OD_VA1: 20/30
OS_VA1: 20/30
OD_SPHERE: PL
OD_AXIS: 105
OS_AXIS: 80
OS_CYLINDER: -1.00
OD_ADD: +3.00

## 2023-09-11 ASSESSMENT — KERATOMETRY
OD_K1POWER_DIOPTERS: 45.75
OS_AXISANGLE_DEGREES: 134
OD_AXISANGLE_DEGREES: 34
OS_K1POWER_DIOPTERS: 46.25
OS_K2POWER_DIOPTERS: 47.25
OD_K2POWER_DIOPTERS: 47.00

## 2023-09-11 ASSESSMENT — REFRACTION_AUTOREFRACTION
OD_AXIS: 105
OS_CYLINDER: -1.00
OD_SPHERE: -0.75
OS_AXIS: 51
OD_CYLINDER: -1.25
OS_SPHERE: -1.25

## 2023-09-11 ASSESSMENT — REFRACTION_CURRENTRX
OS_AXIS: 77
OD_SPHERE: +0.25
OS_OVR_VA: 20/
OS_CYLINDER: -0.75
OD_CYLINDER: -1.25
OD_ADD: +3.00
OS_SPHERE: -1.25
OD_AXIS: 105
OD_OVR_VA: 20/
OS_ADD: +3.00

## 2023-09-11 ASSESSMENT — AXIALLENGTH_DERIVED
OD_AL: 23.0828
OS_AL: 23.0908
OS_AL: 23.0908

## 2023-09-11 ASSESSMENT — VISUAL ACUITY
OD_BCVA: 20/30
OS_BCVA: 20/30-

## 2023-09-11 ASSESSMENT — CONFRONTATIONAL VISUAL FIELD TEST (CVF)
OS_FINDINGS: FULL
OD_FINDINGS: FULL

## 2023-09-18 ENCOUNTER — APPOINTMENT (OUTPATIENT)
Dept: INTERNAL MEDICINE | Facility: CLINIC | Age: 78
End: 2023-09-18
Payer: MEDICARE

## 2023-09-18 VITALS
HEIGHT: 61 IN | HEART RATE: 78 BPM | SYSTOLIC BLOOD PRESSURE: 140 MMHG | BODY MASS INDEX: 29.83 KG/M2 | DIASTOLIC BLOOD PRESSURE: 82 MMHG | RESPIRATION RATE: 18 BRPM | WEIGHT: 158 LBS

## 2023-09-18 DIAGNOSIS — H35.711 CENTRAL SEROUS CHORIORETINOPATHY, RIGHT EYE: ICD-10-CM

## 2023-09-18 PROCEDURE — 99214 OFFICE O/P EST MOD 30 MIN: CPT

## 2023-09-20 LAB
CHOLEST SERPL-MCNC: 161 MG/DL
HDLC SERPL-MCNC: 60 MG/DL
LDLC SERPL CALC-MCNC: 77 MG/DL
NONHDLC SERPL-MCNC: 101 MG/DL
T3 SERPL-MCNC: 133 NG/DL
T4 FREE SERPL-MCNC: 1.4 NG/DL
TRIGL SERPL-MCNC: 137 MG/DL
TSH SERPL-ACNC: 0.89 UIU/ML

## 2023-11-06 ENCOUNTER — OFFICE (OUTPATIENT)
Dept: URBAN - METROPOLITAN AREA CLINIC 77 | Facility: CLINIC | Age: 78
Setting detail: OPHTHALMOLOGY
End: 2023-11-06
Payer: MEDICARE

## 2023-11-06 DIAGNOSIS — H25.13: ICD-10-CM

## 2023-11-06 DIAGNOSIS — H35.3221: ICD-10-CM

## 2023-11-06 DIAGNOSIS — H40.1131: ICD-10-CM

## 2023-11-06 DIAGNOSIS — H35.3211: ICD-10-CM

## 2023-11-06 PROCEDURE — 67028 INJECTION EYE DRUG: CPT | Mod: 50 | Performed by: OPHTHALMOLOGY

## 2023-11-06 PROCEDURE — 92250 FUNDUS PHOTOGRAPHY W/I&R: CPT | Performed by: OPHTHALMOLOGY

## 2023-11-06 PROCEDURE — 99213 OFFICE O/P EST LOW 20 MIN: CPT | Mod: 25 | Performed by: OPHTHALMOLOGY

## 2023-11-06 ASSESSMENT — REFRACTION_CURRENTRX
OD_AXIS: 105
OS_AXIS: 77
OS_OVR_VA: 20/
OD_OVR_VA: 20/
OD_ADD: +3.00
OS_ADD: +3.00
OS_CYLINDER: -0.75
OD_CYLINDER: -1.25
OS_SPHERE: -1.25
OD_SPHERE: +0.25

## 2023-11-06 ASSESSMENT — REFRACTION_MANIFEST
OD_CYLINDER: -1.25
OS_VA1: 20/30
OS_AXIS: 80
OD_VA1: 20/30
OS_SPHERE: -1.25
OD_AXIS: 105
OD_ADD: +3.00
OD_SPHERE: PL
OS_ADD: +3.00
OS_CYLINDER: -1.00

## 2023-11-06 ASSESSMENT — REFRACTION_AUTOREFRACTION
OD_AXIS: 105
OS_CYLINDER: -1.00
OD_SPHERE: -0.75
OS_SPHERE: -1.25
OD_CYLINDER: -1.25
OS_AXIS: 51

## 2023-11-06 ASSESSMENT — SPHEQUIV_DERIVED
OS_SPHEQUIV: -1.75
OS_SPHEQUIV: -1.75
OD_SPHEQUIV: -1.375

## 2023-11-06 ASSESSMENT — CONFRONTATIONAL VISUAL FIELD TEST (CVF)
OD_FINDINGS: FULL
OS_FINDINGS: FULL

## 2023-11-18 ENCOUNTER — APPOINTMENT (OUTPATIENT)
Dept: INTERNAL MEDICINE | Facility: CLINIC | Age: 78
End: 2023-11-18
Payer: MEDICARE

## 2023-11-18 VITALS
HEIGHT: 61 IN | DIASTOLIC BLOOD PRESSURE: 80 MMHG | OXYGEN SATURATION: 98 % | RESPIRATION RATE: 17 BRPM | TEMPERATURE: 97.8 F | HEART RATE: 85 BPM | SYSTOLIC BLOOD PRESSURE: 150 MMHG

## 2023-11-18 DIAGNOSIS — H60.12 CELLULITIS OF LEFT EXTERNAL EAR: ICD-10-CM

## 2023-11-18 DIAGNOSIS — H60.10 CELLULITIS OF EXTERNAL EAR, UNSPECIFIED EAR: ICD-10-CM

## 2023-11-18 PROCEDURE — 99213 OFFICE O/P EST LOW 20 MIN: CPT

## 2023-11-18 RX ORDER — MUPIROCIN 20 MG/G
2 OINTMENT TOPICAL TWICE DAILY
Qty: 1 | Refills: 0 | Status: ACTIVE | COMMUNITY
Start: 2023-11-18 | End: 1900-01-01

## 2023-12-11 ENCOUNTER — OFFICE (OUTPATIENT)
Dept: URBAN - METROPOLITAN AREA CLINIC 77 | Facility: CLINIC | Age: 78
Setting detail: OPHTHALMOLOGY
End: 2023-12-11
Payer: MEDICARE

## 2023-12-11 DIAGNOSIS — H35.3221: ICD-10-CM

## 2023-12-11 DIAGNOSIS — H40.1131: ICD-10-CM

## 2023-12-11 DIAGNOSIS — H35.3211: ICD-10-CM

## 2023-12-11 PROCEDURE — 92250 FUNDUS PHOTOGRAPHY W/I&R: CPT | Performed by: OPHTHALMOLOGY

## 2023-12-11 PROCEDURE — 99213 OFFICE O/P EST LOW 20 MIN: CPT | Mod: 25 | Performed by: OPHTHALMOLOGY

## 2023-12-11 PROCEDURE — 67028 INJECTION EYE DRUG: CPT | Mod: 50 | Performed by: OPHTHALMOLOGY

## 2023-12-11 ASSESSMENT — REFRACTION_MANIFEST
OD_AXIS: 105
OD_ADD: +3.00
OS_CYLINDER: -1.00
OS_VA1: 20/30
OS_ADD: +3.00
OS_AXIS: 80
OD_VA1: 20/30
OS_SPHERE: -1.25
OD_CYLINDER: -1.25
OD_SPHERE: PL

## 2023-12-11 ASSESSMENT — REFRACTION_CURRENTRX
OD_SPHERE: +0.25
OS_AXIS: 77
OS_ADD: +3.00
OS_CYLINDER: -0.75
OS_OVR_VA: 20/
OD_AXIS: 105
OD_OVR_VA: 20/
OD_ADD: +3.00
OS_SPHERE: -1.25
OD_CYLINDER: -1.25

## 2023-12-11 ASSESSMENT — CONFRONTATIONAL VISUAL FIELD TEST (CVF)
OS_FINDINGS: FULL
OD_FINDINGS: FULL

## 2023-12-11 ASSESSMENT — REFRACTION_AUTOREFRACTION
OS_CYLINDER: -1.00
OD_SPHERE: -0.75
OS_SPHERE: -1.25
OD_AXIS: 105
OD_CYLINDER: -1.25
OS_AXIS: 51

## 2023-12-11 ASSESSMENT — SPHEQUIV_DERIVED
OS_SPHEQUIV: -1.75
OD_SPHEQUIV: -1.375
OS_SPHEQUIV: -1.75

## 2024-01-11 ENCOUNTER — OFFICE (OUTPATIENT)
Dept: URBAN - METROPOLITAN AREA CLINIC 77 | Facility: CLINIC | Age: 79
Setting detail: OPHTHALMOLOGY
End: 2024-01-11
Payer: MEDICARE

## 2024-01-11 DIAGNOSIS — H35.3221: ICD-10-CM

## 2024-01-11 DIAGNOSIS — H35.3211: ICD-10-CM

## 2024-01-11 DIAGNOSIS — H40.1131: ICD-10-CM

## 2024-01-11 DIAGNOSIS — H35.3231: ICD-10-CM

## 2024-01-11 PROCEDURE — 92250 FUNDUS PHOTOGRAPHY W/I&R: CPT | Performed by: OPHTHALMOLOGY

## 2024-01-11 PROCEDURE — 99213 OFFICE O/P EST LOW 20 MIN: CPT | Mod: 25 | Performed by: OPHTHALMOLOGY

## 2024-01-11 PROCEDURE — 67028 INJECTION EYE DRUG: CPT | Mod: 50 | Performed by: OPHTHALMOLOGY

## 2024-01-11 ASSESSMENT — SPHEQUIV_DERIVED
OS_SPHEQUIV: -1.75
OS_SPHEQUIV: -1.75
OD_SPHEQUIV: -1.375

## 2024-01-11 ASSESSMENT — REFRACTION_CURRENTRX
OD_OVR_VA: 20/
OD_SPHERE: +0.25
OS_OVR_VA: 20/
OD_CYLINDER: -1.25
OS_SPHERE: -1.25
OS_CYLINDER: -0.75
OS_AXIS: 77
OD_AXIS: 105
OS_ADD: +3.00
OD_ADD: +3.00

## 2024-01-11 ASSESSMENT — REFRACTION_MANIFEST
OD_SPHERE: PL
OD_CYLINDER: -1.25
OD_ADD: +3.00
OS_ADD: +3.00
OD_VA1: 20/30
OD_AXIS: 105
OS_AXIS: 80
OS_SPHERE: -1.25
OS_VA1: 20/30
OS_CYLINDER: -1.00

## 2024-01-11 ASSESSMENT — REFRACTION_AUTOREFRACTION
OS_SPHERE: -1.25
OS_AXIS: 51
OD_CYLINDER: -1.25
OD_AXIS: 105
OS_CYLINDER: -1.00
OD_SPHERE: -0.75

## 2024-01-11 ASSESSMENT — CONFRONTATIONAL VISUAL FIELD TEST (CVF)
OD_FINDINGS: FULL
OS_FINDINGS: FULL

## 2024-01-30 RX ORDER — ALENDRONATE SODIUM 70 MG/1
70 TABLET ORAL
Qty: 12 | Refills: 3 | Status: ACTIVE | COMMUNITY
Start: 2021-09-14 | End: 1900-01-01

## 2024-02-26 ENCOUNTER — OFFICE (OUTPATIENT)
Dept: URBAN - METROPOLITAN AREA CLINIC 77 | Facility: CLINIC | Age: 79
Setting detail: OPHTHALMOLOGY
End: 2024-02-26
Payer: MEDICARE

## 2024-02-26 DIAGNOSIS — H35.3231: ICD-10-CM

## 2024-02-26 DIAGNOSIS — H40.1131: ICD-10-CM

## 2024-02-26 PROCEDURE — 67028 INJECTION EYE DRUG: CPT | Mod: 50 | Performed by: OPHTHALMOLOGY

## 2024-02-26 PROCEDURE — 99213 OFFICE O/P EST LOW 20 MIN: CPT | Mod: 25 | Performed by: OPHTHALMOLOGY

## 2024-02-26 PROCEDURE — 92134 CPTRZ OPH DX IMG PST SGM RTA: CPT | Performed by: OPHTHALMOLOGY

## 2024-02-26 ASSESSMENT — REFRACTION_AUTOREFRACTION
OD_SPHERE: -0.75
OD_AXIS: 105
OD_CYLINDER: -1.25
OS_AXIS: 51
OS_SPHERE: -1.25
OS_CYLINDER: -1.00

## 2024-02-26 ASSESSMENT — REFRACTION_CURRENTRX
OS_OVR_VA: 20/
OS_CYLINDER: -0.75
OS_ADD: +3.00
OD_CYLINDER: -1.25
OD_OVR_VA: 20/
OD_AXIS: 105
OD_ADD: +3.00
OS_AXIS: 77
OS_SPHERE: -1.25
OD_SPHERE: +0.25

## 2024-02-26 ASSESSMENT — REFRACTION_MANIFEST
OD_VA1: 20/30
OS_SPHERE: -1.25
OS_ADD: +3.00
OD_CYLINDER: -1.25
OD_SPHERE: PL
OD_ADD: +3.00
OS_AXIS: 80
OD_AXIS: 105
OS_VA1: 20/30
OS_CYLINDER: -1.00

## 2024-02-26 ASSESSMENT — SPHEQUIV_DERIVED
OD_SPHEQUIV: -1.375
OS_SPHEQUIV: -1.75
OS_SPHEQUIV: -1.75

## 2024-02-26 ASSESSMENT — CONFRONTATIONAL VISUAL FIELD TEST (CVF)
OD_FINDINGS: FULL
OS_FINDINGS: FULL

## 2024-03-21 ENCOUNTER — APPOINTMENT (OUTPATIENT)
Dept: INTERNAL MEDICINE | Facility: CLINIC | Age: 79
End: 2024-03-21
Payer: MEDICARE

## 2024-03-21 VITALS
HEART RATE: 89 BPM | DIASTOLIC BLOOD PRESSURE: 82 MMHG | OXYGEN SATURATION: 99 % | RESPIRATION RATE: 18 BRPM | HEIGHT: 61 IN | SYSTOLIC BLOOD PRESSURE: 144 MMHG | WEIGHT: 158 LBS | TEMPERATURE: 97.2 F | BODY MASS INDEX: 29.83 KG/M2

## 2024-03-21 DIAGNOSIS — I10 ESSENTIAL (PRIMARY) HYPERTENSION: ICD-10-CM

## 2024-03-21 DIAGNOSIS — M81.0 AGE-RELATED OSTEOPOROSIS W/OUT CURRENT PATHOLOGICAL FRACTURE: ICD-10-CM

## 2024-03-21 DIAGNOSIS — E78.5 HYPERLIPIDEMIA, UNSPECIFIED: ICD-10-CM

## 2024-03-21 DIAGNOSIS — M19.90 UNSPECIFIED OSTEOARTHRITIS, UNSPECIFIED SITE: ICD-10-CM

## 2024-03-21 DIAGNOSIS — H35.30 UNSPECIFIED MACULAR DEGENERATION: ICD-10-CM

## 2024-03-21 DIAGNOSIS — E03.9 HYPOTHYROIDISM, UNSPECIFIED: ICD-10-CM

## 2024-03-21 PROCEDURE — 99214 OFFICE O/P EST MOD 30 MIN: CPT

## 2024-03-21 PROCEDURE — G2211 COMPLEX E/M VISIT ADD ON: CPT

## 2024-03-21 PROCEDURE — 36415 COLL VENOUS BLD VENIPUNCTURE: CPT

## 2024-03-21 RX ORDER — CEPHALEXIN 250 MG/1
250 CAPSULE ORAL EVERY 6 HOURS
Qty: 28 | Refills: 0 | Status: DISCONTINUED | COMMUNITY
Start: 2023-11-18 | End: 2024-03-21

## 2024-03-21 NOTE — PHYSICAL EXAM
[No Acute Distress] : no acute distress [Well Developed] : well developed [Well Nourished] : well nourished [Normal Sclera/Conjunctiva] : normal sclera/conjunctiva [Well-Appearing] : well-appearing [EOMI] : extraocular movements intact [PERRL] : pupils equal round and reactive to light [Normal Outer Ear/Nose] : the outer ears and nose were normal in appearance [No JVD] : no jugular venous distention [Normal Oropharynx] : the oropharynx was normal [Supple] : supple [No Lymphadenopathy] : no lymphadenopathy [Thyroid Normal, No Nodules] : the thyroid was normal and there were no nodules present [No Accessory Muscle Use] : no accessory muscle use [No Respiratory Distress] : no respiratory distress  [Clear to Auscultation] : lungs were clear to auscultation bilaterally [Normal Rate] : normal rate  [Regular Rhythm] : with a regular rhythm [Normal S1, S2] : normal S1 and S2 [No Murmur] : no murmur heard [No Carotid Bruits] : no carotid bruits [No Abdominal Bruit] : a ~M bruit was not heard ~T in the abdomen [No Varicosities] : no varicosities [No Edema] : there was no peripheral edema [Pedal Pulses Present] : the pedal pulses are present [No Palpable Aorta] : no palpable aorta [Soft] : abdomen soft [No Extremity Clubbing/Cyanosis] : no extremity clubbing/cyanosis [Non Tender] : non-tender [Non-distended] : non-distended [No Masses] : no abdominal mass palpated [No HSM] : no HSM [Normal Posterior Cervical Nodes] : no posterior cervical lymphadenopathy [Normal Bowel Sounds] : normal bowel sounds [Normal Anterior Cervical Nodes] : no anterior cervical lymphadenopathy [No CVA Tenderness] : no CVA  tenderness [No Spinal Tenderness] : no spinal tenderness [No Rash] : no rash [No Joint Swelling] : no joint swelling [Grossly Normal Strength/Tone] : grossly normal strength/tone [No Focal Deficits] : no focal deficits [Coordination Grossly Intact] : coordination grossly intact [Normal Gait] : normal gait [Deep Tendon Reflexes (DTR)] : deep tendon reflexes were 2+ and symmetric [Normal Affect] : the affect was normal [Normal Insight/Judgement] : insight and judgment were intact

## 2024-03-21 NOTE — HISTORY OF PRESENT ILLNESS
[FreeTextEntry1] : The patient is here for a follow up visit to review their medications and chronic medical conditions.  hypothyroid, htn, hld [de-identified] : c/o hypothyroid symptoms- wt gain, sluggish, brittle nails.  Otherwise feels good. Appet, sleep, bms, voiding, mood all ok. no pain.  Reviewed all interim as well as relevant prior consultations, labs and radiological studies.

## 2024-03-21 NOTE — ASSESSMENT
[FreeTextEntry1] : HLD- stable, cont atorv HTN-stable, cont metoprolol Osteoperosis- cont fosamax, ca/d reviewed BD- prior OP in L1 not mentioned. now osteopenia- cont fosamax for 2.5 more yrs to finish out 5 yrs.  Dry Eyes- restasis, sees optho Mac Degen- injections q5w, sees optho Hypothyroid-stable, cont synthroid  112, chk labs OA- conserv tx.  Pt. was encouraged to do all relevant screening tests including but not limited to mammogram, gyn visit, colonoscopy, bone density, annual skin exam.  Discussed the importance and benefit of a healthy lifestyle including a heart healthy diet such as the Mediterranean diet and regular exercise as well as 7 hours of sleep nightly.  Total time 30 min ( 20 min. FTF and 10 min chart review and documentation.)

## 2024-03-22 LAB
ALBUMIN SERPL ELPH-MCNC: 4.3 G/DL
ALP BLD-CCNC: 78 U/L
ALT SERPL-CCNC: 15 U/L
ANION GAP SERPL CALC-SCNC: 11 MMOL/L
APPEARANCE: CLEAR
AST SERPL-CCNC: 25 U/L
BACTERIA: NEGATIVE /HPF
BASOPHILS # BLD AUTO: 0.04 K/UL
BASOPHILS NFR BLD AUTO: 0.6 %
BILIRUB SERPL-MCNC: 0.7 MG/DL
BILIRUBIN URINE: NEGATIVE
BLOOD URINE: NEGATIVE
BUN SERPL-MCNC: 16 MG/DL
CALCIUM SERPL-MCNC: 9.5 MG/DL
CAST: 8 /LPF
CHLORIDE SERPL-SCNC: 102 MMOL/L
CHOLEST SERPL-MCNC: 157 MG/DL
CO2 SERPL-SCNC: 26 MMOL/L
COLOR: YELLOW
CREAT SERPL-MCNC: 0.67 MG/DL
EGFR: 89 ML/MIN/1.73M2
EOSINOPHIL # BLD AUTO: 0.07 K/UL
EOSINOPHIL NFR BLD AUTO: 1.1 %
EPITHELIAL CELLS: 8 /HPF
ESTIMATED AVERAGE GLUCOSE: 117 MG/DL
GLUCOSE QUALITATIVE U: NEGATIVE MG/DL
GLUCOSE SERPL-MCNC: 100 MG/DL
HBA1C MFR BLD HPLC: 5.7 %
HCT VFR BLD CALC: 42.8 %
HDLC SERPL-MCNC: 56 MG/DL
HGB BLD-MCNC: 13.6 G/DL
HYALINE CASTS: PRESENT
IMM GRANULOCYTES NFR BLD AUTO: 0.2 %
KETONES URINE: NEGATIVE MG/DL
LDLC SERPL CALC-MCNC: 71 MG/DL
LEUKOCYTE ESTERASE URINE: ABNORMAL
LYMPHOCYTES # BLD AUTO: 1.93 K/UL
LYMPHOCYTES NFR BLD AUTO: 30.1 %
MAN DIFF?: NORMAL
MCHC RBC-ENTMCNC: 31.8 GM/DL
MCHC RBC-ENTMCNC: 31.9 PG
MCV RBC AUTO: 100.2 FL
MICROSCOPIC-UA: NORMAL
MONOCYTES # BLD AUTO: 0.61 K/UL
MONOCYTES NFR BLD AUTO: 9.5 %
MUCUS: PRESENT
NEUTROPHILS # BLD AUTO: 3.75 K/UL
NEUTROPHILS NFR BLD AUTO: 58.5 %
NITRITE URINE: NEGATIVE
NONHDLC SERPL-MCNC: 101 MG/DL
PH URINE: 6
PLATELET # BLD AUTO: 310 K/UL
POTASSIUM SERPL-SCNC: 4.9 MMOL/L
PROT SERPL-MCNC: 7.1 G/DL
PROTEIN URINE: NEGATIVE MG/DL
RBC # BLD: 4.27 M/UL
RBC # FLD: 12.8 %
RED BLOOD CELLS URINE: 2 /HPF
REVIEW: NORMAL
SODIUM SERPL-SCNC: 139 MMOL/L
SPECIFIC GRAVITY URINE: 1.01
T3 SERPL-MCNC: 136 NG/DL
T4 FREE SERPL-MCNC: 1.4 NG/DL
TRIGL SERPL-MCNC: 179 MG/DL
TSH SERPL-ACNC: 0.58 UIU/ML
UROBILINOGEN URINE: 0.2 MG/DL
WBC # FLD AUTO: 6.41 K/UL
WHITE BLOOD CELLS URINE: 23 /HPF

## 2024-04-04 ENCOUNTER — OFFICE (OUTPATIENT)
Dept: URBAN - METROPOLITAN AREA CLINIC 77 | Facility: CLINIC | Age: 79
Setting detail: OPHTHALMOLOGY
End: 2024-04-04
Payer: MEDICARE

## 2024-04-04 DIAGNOSIS — H40.1131: ICD-10-CM

## 2024-04-04 DIAGNOSIS — H35.3211: ICD-10-CM

## 2024-04-04 DIAGNOSIS — H35.3221: ICD-10-CM

## 2024-04-04 PROCEDURE — 99213 OFFICE O/P EST LOW 20 MIN: CPT | Mod: 25 | Performed by: OPHTHALMOLOGY

## 2024-04-04 PROCEDURE — 92250 FUNDUS PHOTOGRAPHY W/I&R: CPT | Performed by: OPHTHALMOLOGY

## 2024-04-04 PROCEDURE — 67028 INJECTION EYE DRUG: CPT | Mod: RT | Performed by: OPHTHALMOLOGY

## 2024-05-13 ENCOUNTER — OFFICE (OUTPATIENT)
Dept: URBAN - METROPOLITAN AREA CLINIC 77 | Facility: CLINIC | Age: 79
Setting detail: OPHTHALMOLOGY
End: 2024-05-13
Payer: MEDICARE

## 2024-05-13 DIAGNOSIS — H40.1131: ICD-10-CM

## 2024-05-13 DIAGNOSIS — H35.3231: ICD-10-CM

## 2024-05-13 PROCEDURE — 99213 OFFICE O/P EST LOW 20 MIN: CPT | Mod: 25 | Performed by: OPHTHALMOLOGY

## 2024-05-13 PROCEDURE — 92134 CPTRZ OPH DX IMG PST SGM RTA: CPT | Performed by: OPHTHALMOLOGY

## 2024-05-13 PROCEDURE — 67028 INJECTION EYE DRUG: CPT | Mod: 50 | Performed by: OPHTHALMOLOGY

## 2024-05-13 ASSESSMENT — CONFRONTATIONAL VISUAL FIELD TEST (CVF)
OD_FINDINGS: FULL
OS_FINDINGS: FULL

## 2024-05-21 ENCOUNTER — RX RENEWAL (OUTPATIENT)
Age: 79
End: 2024-05-21

## 2024-05-21 RX ORDER — LEVOTHYROXINE SODIUM 0.11 MG/1
112 TABLET ORAL
Qty: 90 | Refills: 3 | Status: ACTIVE | COMMUNITY
Start: 2022-07-01 | End: 1900-01-01

## 2024-07-08 ENCOUNTER — OFFICE (OUTPATIENT)
Dept: URBAN - METROPOLITAN AREA CLINIC 77 | Facility: CLINIC | Age: 79
Setting detail: OPHTHALMOLOGY
End: 2024-07-08
Payer: MEDICARE

## 2024-07-08 DIAGNOSIS — H35.3231: ICD-10-CM

## 2024-07-08 DIAGNOSIS — H40.1131: ICD-10-CM

## 2024-07-08 PROCEDURE — 92250 FUNDUS PHOTOGRAPHY W/I&R: CPT | Performed by: OPHTHALMOLOGY

## 2024-07-08 PROCEDURE — 99213 OFFICE O/P EST LOW 20 MIN: CPT | Mod: 25 | Performed by: OPHTHALMOLOGY

## 2024-07-08 PROCEDURE — 67028 INJECTION EYE DRUG: CPT | Mod: 50 | Performed by: OPHTHALMOLOGY

## 2024-07-08 ASSESSMENT — CONFRONTATIONAL VISUAL FIELD TEST (CVF)
OS_FINDINGS: FULL
OD_FINDINGS: FULL

## 2024-07-15 ENCOUNTER — APPOINTMENT (OUTPATIENT)
Dept: INTERNAL MEDICINE | Facility: CLINIC | Age: 79
End: 2024-07-15
Payer: MEDICARE

## 2024-07-15 VITALS
HEART RATE: 88 BPM | HEIGHT: 61 IN | TEMPERATURE: 97.3 F | BODY MASS INDEX: 29.07 KG/M2 | WEIGHT: 154 LBS | DIASTOLIC BLOOD PRESSURE: 88 MMHG | OXYGEN SATURATION: 99 % | SYSTOLIC BLOOD PRESSURE: 160 MMHG | RESPIRATION RATE: 18 BRPM

## 2024-07-15 DIAGNOSIS — M62.838 OTHER MUSCLE SPASM: ICD-10-CM

## 2024-07-15 DIAGNOSIS — M81.0 AGE-RELATED OSTEOPOROSIS W/OUT CURRENT PATHOLOGICAL FRACTURE: ICD-10-CM

## 2024-07-15 DIAGNOSIS — E78.5 HYPERLIPIDEMIA, UNSPECIFIED: ICD-10-CM

## 2024-07-15 DIAGNOSIS — I10 ESSENTIAL (PRIMARY) HYPERTENSION: ICD-10-CM

## 2024-07-15 DIAGNOSIS — E03.9 HYPOTHYROIDISM, UNSPECIFIED: ICD-10-CM

## 2024-07-15 PROCEDURE — G2211 COMPLEX E/M VISIT ADD ON: CPT

## 2024-07-15 PROCEDURE — 99214 OFFICE O/P EST MOD 30 MIN: CPT

## 2024-07-15 RX ORDER — TIZANIDINE 2 MG/1
2 TABLET ORAL
Qty: 30 | Refills: 0 | Status: ACTIVE | COMMUNITY
Start: 2024-07-15 | End: 1900-01-01

## 2024-07-15 RX ORDER — MELOXICAM 7.5 MG/1
7.5 TABLET ORAL
Qty: 30 | Refills: 0 | Status: ACTIVE | COMMUNITY
Start: 2024-07-15 | End: 1900-01-01

## 2024-08-12 ENCOUNTER — RX RENEWAL (OUTPATIENT)
Age: 79
End: 2024-08-12

## 2024-09-09 ENCOUNTER — OFFICE (OUTPATIENT)
Dept: URBAN - METROPOLITAN AREA CLINIC 77 | Facility: CLINIC | Age: 79
Setting detail: OPHTHALMOLOGY
End: 2024-09-09
Payer: MEDICARE

## 2024-09-09 DIAGNOSIS — H40.1131: ICD-10-CM

## 2024-09-09 DIAGNOSIS — H35.3221: ICD-10-CM

## 2024-09-09 DIAGNOSIS — H35.3231: ICD-10-CM

## 2024-09-09 DIAGNOSIS — H35.3211: ICD-10-CM

## 2024-09-09 PROBLEM — H53.10 SUBJECTIVE VISUAL DISTRUBANCES ; RIGHT EYE: Status: ACTIVE | Noted: 2024-09-09

## 2024-09-09 PROCEDURE — 67028 INJECTION EYE DRUG: CPT | Mod: 50 | Performed by: OPHTHALMOLOGY

## 2024-09-09 PROCEDURE — 99213 OFFICE O/P EST LOW 20 MIN: CPT | Mod: 25 | Performed by: OPHTHALMOLOGY

## 2024-09-09 PROCEDURE — 92134 CPTRZ OPH DX IMG PST SGM RTA: CPT | Performed by: OPHTHALMOLOGY

## 2024-09-09 ASSESSMENT — CONFRONTATIONAL VISUAL FIELD TEST (CVF)
OS_FINDINGS: FULL
OD_FINDINGS: FULL

## 2024-10-01 ENCOUNTER — APPOINTMENT (OUTPATIENT)
Dept: INTERNAL MEDICINE | Facility: CLINIC | Age: 79
End: 2024-10-01
Payer: MEDICARE

## 2024-10-01 ENCOUNTER — NON-APPOINTMENT (OUTPATIENT)
Age: 79
End: 2024-10-01

## 2024-10-01 VITALS
BODY MASS INDEX: 29.27 KG/M2 | RESPIRATION RATE: 18 BRPM | TEMPERATURE: 97.1 F | HEART RATE: 86 BPM | DIASTOLIC BLOOD PRESSURE: 84 MMHG | SYSTOLIC BLOOD PRESSURE: 142 MMHG | OXYGEN SATURATION: 99 % | WEIGHT: 155 LBS | HEIGHT: 61 IN

## 2024-10-01 DIAGNOSIS — M81.0 AGE-RELATED OSTEOPOROSIS W/OUT CURRENT PATHOLOGICAL FRACTURE: ICD-10-CM

## 2024-10-01 DIAGNOSIS — I10 ESSENTIAL (PRIMARY) HYPERTENSION: ICD-10-CM

## 2024-10-01 DIAGNOSIS — E03.9 HYPOTHYROIDISM, UNSPECIFIED: ICD-10-CM

## 2024-10-01 DIAGNOSIS — E78.5 HYPERLIPIDEMIA, UNSPECIFIED: ICD-10-CM

## 2024-10-01 DIAGNOSIS — H35.30 UNSPECIFIED MACULAR DEGENERATION: ICD-10-CM

## 2024-10-01 DIAGNOSIS — Z00.00 ENCOUNTER FOR GENERAL ADULT MEDICAL EXAMINATION W/OUT ABNORMAL FINDINGS: ICD-10-CM

## 2024-10-01 DIAGNOSIS — M19.90 UNSPECIFIED OSTEOARTHRITIS, UNSPECIFIED SITE: ICD-10-CM

## 2024-10-01 DIAGNOSIS — L30.9 DERMATITIS, UNSPECIFIED: ICD-10-CM

## 2024-10-01 PROCEDURE — 99214 OFFICE O/P EST MOD 30 MIN: CPT

## 2024-10-01 PROCEDURE — G2211 COMPLEX E/M VISIT ADD ON: CPT

## 2024-10-01 PROCEDURE — 93000 ELECTROCARDIOGRAM COMPLETE: CPT

## 2024-10-01 RX ORDER — TRIAMCINOLONE ACETONIDE 1 MG/G
0.1 CREAM TOPICAL
Qty: 1 | Refills: 1 | Status: ACTIVE | COMMUNITY
Start: 2024-10-01 | End: 1900-01-01

## 2024-10-01 NOTE — ASSESSMENT
[FreeTextEntry1] : HLD- stable, cont atorv HTN-stable, cont metoprolol Osteoperosis- cont fosamax, ca/d reviewed BD- prior OP in L1 not mentioned. now osteopenia- cont fosamax for 2 more yrs to finish out 5 yrs.  Dry Eyes- restasis, sees optho Mac Degen- injections q5w, sees optho Hypothyroid-stable, cont synthroid  112, chk labs OA- conserv tx.   Pt. was encouraged to do all relevant screening tests including but not limited to mammogram, gyn visit, colonoscopy, bone density, annual skin exam.  Discussed the importance and benefit of a healthy lifestyle including a heart healthy diet such as the Mediterranean diet and regular exercise as well as 7 hours of sleep nightly.  Discussed the importance of screening for colon cancer. Reviewed screening reccomendations including colonoscopy, Cologuard and Fit DNA testing. I strongly encouraged colonoscopy as that is the best screening test to detect both colon cancer and polyps and is the gold standard.

## 2024-10-01 NOTE — HISTORY OF PRESENT ILLNESS
[FreeTextEntry1] : cristina [de-identified] : on fosamax since 2/2021 decl all vaccines mac ben- sees retinoologist- gets injections.  sees derm , pod.  Otherwise feels good. Appet, sleep, bms, voiding, mood all ok. no pain.  Reviewed all interim as well as relevant prior consultations, labs and radiological studies.

## 2024-10-01 NOTE — HEALTH RISK ASSESSMENT
[Good] : ~his/her~  mood as  good [Never (0 pts)] : Never (0 points) [No] : In the past 12 months have you used drugs other than those required for medical reasons? No [No falls in past year] : Patient reported no falls in the past year [0] : 2) Feeling down, depressed, or hopeless: Not at all (0) [PHQ-2 Negative - No further assessment needed] : PHQ-2 Negative - No further assessment needed [Never] : Never [Patient reported mammogram was normal] : Patient reported mammogram was normal [Patient reported bone density results were abnormal] : Patient reported bone density results were abnormal [Patient declined colonoscopy] : Patient declined colonoscopy [None] : None [With Family] : lives with family [Retired] : retired [] :  [Fully functional (bathing, dressing, toileting, transferring, walking, feeding)] : Fully functional (bathing, dressing, toileting, transferring, walking, feeding) [Fully functional (using the telephone, shopping, preparing meals, housekeeping, doing laundry, using] : Fully functional and needs no help or supervision to perform IADLs (using the telephone, shopping, preparing meals, housekeeping, doing laundry, using transportation, managing medications and managing finances) [Audit-CScore] : 0 [de-identified] : some exc [de-identified] : healthy [[id number]] : 0 [Change in mental status noted] : No change in mental status noted [Language] : denies difficulty with language [Behavior] : denies difficulty with behavior [Handling Complex Tasks] : denies difficulty handling complex tasks [Reasoning] : denies difficulty with reasoning [Reports changes in hearing] : Reports no changes in hearing [Reports changes in vision] : Reports no changes in vision [Reports changes in dental health] : Reports no changes in dental health [MammogramDate] : 2023 [BoneDensityDate] : 2023 [AdvancecareDate] : 10/1/24

## 2024-10-06 LAB
25(OH)D3 SERPL-MCNC: 42.7 NG/ML
ALBUMIN SERPL ELPH-MCNC: 4.5 G/DL
ALP BLD-CCNC: 84 U/L
ALT SERPL-CCNC: 16 U/L
ANION GAP SERPL CALC-SCNC: 13 MMOL/L
APPEARANCE: CLEAR
AST SERPL-CCNC: 29 U/L
BACTERIA UR CULT: NORMAL
BACTERIA: NEGATIVE /HPF
BASOPHILS # BLD AUTO: 0.05 K/UL
BASOPHILS NFR BLD AUTO: 0.8 %
BILIRUB SERPL-MCNC: 0.7 MG/DL
BILIRUBIN URINE: NEGATIVE
BLOOD URINE: NEGATIVE
BUN SERPL-MCNC: 12 MG/DL
CALCIUM SERPL-MCNC: 10.3 MG/DL
CAST: 0 /LPF
CHLORIDE SERPL-SCNC: 104 MMOL/L
CHOLEST SERPL-MCNC: 181 MG/DL
CO2 SERPL-SCNC: 28 MMOL/L
COLOR: YELLOW
CREAT SERPL-MCNC: 0.58 MG/DL
EGFR: 92 ML/MIN/1.73M2
EOSINOPHIL # BLD AUTO: 0.1 K/UL
EOSINOPHIL NFR BLD AUTO: 1.6 %
EPITHELIAL CELLS: 2 /HPF
ESTIMATED AVERAGE GLUCOSE: 114 MG/DL
GLUCOSE QUALITATIVE U: NEGATIVE MG/DL
GLUCOSE SERPL-MCNC: 98 MG/DL
HBA1C MFR BLD HPLC: 5.6 %
HCT VFR BLD CALC: 44 %
HDLC SERPL-MCNC: 61 MG/DL
HGB BLD-MCNC: 13.7 G/DL
IMM GRANULOCYTES NFR BLD AUTO: 0.2 %
KETONES URINE: NEGATIVE MG/DL
LDLC SERPL CALC-MCNC: 84 MG/DL
LEUKOCYTE ESTERASE URINE: ABNORMAL
LYMPHOCYTES # BLD AUTO: 2.06 K/UL
LYMPHOCYTES NFR BLD AUTO: 32.1 %
MAN DIFF?: NORMAL
MCHC RBC-ENTMCNC: 31.1 GM/DL
MCHC RBC-ENTMCNC: 31.6 PG
MCV RBC AUTO: 101.4 FL
MICROSCOPIC-UA: NORMAL
MONOCYTES # BLD AUTO: 0.69 K/UL
MONOCYTES NFR BLD AUTO: 10.7 %
NEUTROPHILS # BLD AUTO: 3.51 K/UL
NEUTROPHILS NFR BLD AUTO: 54.6 %
NITRITE URINE: NEGATIVE
NONHDLC SERPL-MCNC: 120 MG/DL
PH URINE: 7
PLATELET # BLD AUTO: 306 K/UL
POTASSIUM SERPL-SCNC: 4.7 MMOL/L
PROT SERPL-MCNC: 7.4 G/DL
PROTEIN URINE: NEGATIVE MG/DL
RBC # BLD: 4.34 M/UL
RBC # FLD: 12.9 %
RED BLOOD CELLS URINE: 0 /HPF
REVIEW: NORMAL
SODIUM SERPL-SCNC: 144 MMOL/L
SPECIFIC GRAVITY URINE: 1.01
T3 SERPL-MCNC: 124 NG/DL
T4 FREE SERPL-MCNC: 1.4 NG/DL
TRIGL SERPL-MCNC: 212 MG/DL
TSH SERPL-ACNC: 0.81 UIU/ML
UROBILINOGEN URINE: 0.2 MG/DL
VIT B12 SERPL-MCNC: 681 PG/ML
WBC # FLD AUTO: 6.42 K/UL
WHITE BLOOD CELLS URINE: 2 /HPF

## 2024-10-07 ENCOUNTER — NON-APPOINTMENT (OUTPATIENT)
Age: 79
End: 2024-10-07

## 2024-11-04 ENCOUNTER — OFFICE (OUTPATIENT)
Dept: URBAN - METROPOLITAN AREA CLINIC 77 | Facility: CLINIC | Age: 79
Setting detail: OPHTHALMOLOGY
End: 2024-11-04
Payer: MEDICARE

## 2024-11-04 DIAGNOSIS — H35.3221: ICD-10-CM

## 2024-11-04 DIAGNOSIS — H35.3231: ICD-10-CM

## 2024-11-04 DIAGNOSIS — H35.3211: ICD-10-CM

## 2024-11-04 DIAGNOSIS — H43.391: ICD-10-CM

## 2024-11-04 DIAGNOSIS — H40.1131: ICD-10-CM

## 2024-11-04 DIAGNOSIS — H43.811: ICD-10-CM

## 2024-11-04 PROCEDURE — 67028 INJECTION EYE DRUG: CPT | Mod: 50 | Performed by: OPHTHALMOLOGY

## 2024-11-04 PROCEDURE — 99213 OFFICE O/P EST LOW 20 MIN: CPT | Mod: 25 | Performed by: OPHTHALMOLOGY

## 2024-11-04 PROCEDURE — 92134 CPTRZ OPH DX IMG PST SGM RTA: CPT | Performed by: OPHTHALMOLOGY

## 2024-11-04 ASSESSMENT — REFRACTION_CURRENTRX
OS_SPHERE: -1.25
OD_AXIS: 105
OD_ADD: +3.00
OS_AXIS: 77
OS_CYLINDER: -0.75
OD_SPHERE: +0.25
OD_CYLINDER: -1.25
OS_OVR_VA: 20/
OD_OVR_VA: 20/
OS_ADD: +3.00

## 2024-11-04 ASSESSMENT — REFRACTION_MANIFEST
OS_CYLINDER: -1.00
OD_ADD: +3.00
OS_AXIS: 80
OS_ADD: +3.00
OS_VA1: 20/30
OD_SPHERE: PL
OD_AXIS: 105
OS_SPHERE: -1.25
OD_CYLINDER: -1.25
OD_VA1: 20/30

## 2024-11-04 ASSESSMENT — KERATOMETRY
OD_AXISANGLE_DEGREES: 34
OD_K2POWER_DIOPTERS: 47.00
OS_K2POWER_DIOPTERS: 47.25
OS_K1POWER_DIOPTERS: 46.25
OS_AXISANGLE_DEGREES: 134
OD_K1POWER_DIOPTERS: 45.75

## 2024-11-04 ASSESSMENT — VISUAL ACUITY
OD_BCVA: 20/40-
OS_BCVA: 20/70-

## 2024-11-04 ASSESSMENT — REFRACTION_AUTOREFRACTION
OS_SPHERE: -1.25
OS_AXIS: 51
OD_AXIS: 105
OS_CYLINDER: -1.00
OD_SPHERE: -0.75
OD_CYLINDER: -1.25

## 2024-11-04 ASSESSMENT — CONFRONTATIONAL VISUAL FIELD TEST (CVF)
OS_FINDINGS: FULL
OD_FINDINGS: FULL

## 2024-12-02 ENCOUNTER — OFFICE (OUTPATIENT)
Dept: URBAN - METROPOLITAN AREA CLINIC 77 | Facility: CLINIC | Age: 79
Setting detail: OPHTHALMOLOGY
End: 2024-12-02
Payer: MEDICARE

## 2024-12-02 DIAGNOSIS — H25.13: ICD-10-CM

## 2024-12-02 DIAGNOSIS — H35.3231: ICD-10-CM

## 2024-12-02 DIAGNOSIS — H35.3211: ICD-10-CM

## 2024-12-02 DIAGNOSIS — H35.3221: ICD-10-CM

## 2024-12-02 PROCEDURE — 67028 INJECTION EYE DRUG: CPT | Mod: 50 | Performed by: OPHTHALMOLOGY

## 2024-12-02 PROCEDURE — 92134 CPTRZ OPH DX IMG PST SGM RTA: CPT | Performed by: OPHTHALMOLOGY

## 2024-12-02 PROCEDURE — 99213 OFFICE O/P EST LOW 20 MIN: CPT | Mod: 25 | Performed by: OPHTHALMOLOGY

## 2024-12-02 ASSESSMENT — REFRACTION_AUTOREFRACTION
OS_AXIS: 51
OD_SPHERE: -0.75
OS_SPHERE: -1.25
OD_AXIS: 105
OD_CYLINDER: -1.25
OS_CYLINDER: -1.00

## 2024-12-02 ASSESSMENT — REFRACTION_MANIFEST
OS_VA1: 20/30
OS_CYLINDER: -1.00
OD_AXIS: 105
OD_ADD: +3.00
OS_SPHERE: -1.25
OS_ADD: +3.00
OD_CYLINDER: -1.25
OD_VA1: 20/30
OS_AXIS: 80
OD_SPHERE: PL

## 2024-12-02 ASSESSMENT — KERATOMETRY
OS_K1POWER_DIOPTERS: 46.25
OD_AXISANGLE_DEGREES: 34
OS_K2POWER_DIOPTERS: 47.25
OD_K1POWER_DIOPTERS: 45.75
OS_AXISANGLE_DEGREES: 134
OD_K2POWER_DIOPTERS: 47.00

## 2024-12-02 ASSESSMENT — REFRACTION_CURRENTRX
OD_OVR_VA: 20/
OS_OVR_VA: 20/
OS_AXIS: 77
OS_ADD: +3.00
OS_SPHERE: -1.25
OD_CYLINDER: -1.25
OD_SPHERE: +0.25
OD_AXIS: 105
OS_CYLINDER: -0.75
OD_ADD: +3.00

## 2024-12-02 ASSESSMENT — VISUAL ACUITY
OS_BCVA: 20/80-
OD_BCVA: 20/50-

## 2024-12-02 ASSESSMENT — CONFRONTATIONAL VISUAL FIELD TEST (CVF)
OS_FINDINGS: FULL
OD_FINDINGS: FULL

## 2025-01-10 ENCOUNTER — OFFICE (OUTPATIENT)
Dept: URBAN - METROPOLITAN AREA CLINIC 77 | Facility: CLINIC | Age: 80
Setting detail: OPHTHALMOLOGY
End: 2025-01-10
Payer: MEDICARE

## 2025-01-10 DIAGNOSIS — H35.3211: ICD-10-CM

## 2025-01-10 DIAGNOSIS — H35.3231: ICD-10-CM

## 2025-01-10 PROCEDURE — 67028 INJECTION EYE DRUG: CPT | Mod: RT | Performed by: OPHTHALMOLOGY

## 2025-01-10 PROCEDURE — 92134 CPTRZ OPH DX IMG PST SGM RTA: CPT | Performed by: OPHTHALMOLOGY

## 2025-01-10 ASSESSMENT — REFRACTION_AUTOREFRACTION
OS_SPHERE: -1.25
OS_CYLINDER: -1.00
OD_AXIS: 105
OS_AXIS: 51
OD_SPHERE: -0.75
OD_CYLINDER: -1.25

## 2025-01-10 ASSESSMENT — REFRACTION_CURRENTRX
OD_ADD: +3.00
OS_SPHERE: -1.25
OD_SPHERE: +0.25
OD_AXIS: 105
OD_CYLINDER: -1.25
OS_ADD: +3.00
OS_OVR_VA: 20/
OD_OVR_VA: 20/
OS_CYLINDER: -0.75
OS_AXIS: 77

## 2025-01-10 ASSESSMENT — REFRACTION_MANIFEST
OS_AXIS: 80
OS_ADD: +3.00
OS_VA1: 20/30
OS_CYLINDER: -1.00
OD_VA1: 20/30
OD_SPHERE: PL
OS_SPHERE: -1.25
OD_CYLINDER: -1.25
OD_ADD: +3.00
OD_AXIS: 105

## 2025-01-10 ASSESSMENT — VISUAL ACUITY
OS_BCVA: 20/80
OD_BCVA: 20/70

## 2025-01-10 ASSESSMENT — KERATOMETRY
OS_K1POWER_DIOPTERS: 46.25
OD_AXISANGLE_DEGREES: 34
OD_K1POWER_DIOPTERS: 45.75
OS_K2POWER_DIOPTERS: 47.25
OD_K2POWER_DIOPTERS: 47.00
OS_AXISANGLE_DEGREES: 134

## 2025-01-10 ASSESSMENT — CONFRONTATIONAL VISUAL FIELD TEST (CVF)
OD_FINDINGS: FULL
OS_FINDINGS: FULL

## 2025-02-10 ENCOUNTER — OFFICE (OUTPATIENT)
Dept: URBAN - METROPOLITAN AREA CLINIC 77 | Facility: CLINIC | Age: 80
Setting detail: OPHTHALMOLOGY
End: 2025-02-10
Payer: MEDICARE

## 2025-02-10 DIAGNOSIS — H35.3211: ICD-10-CM

## 2025-02-10 DIAGNOSIS — H35.3231: ICD-10-CM

## 2025-02-10 DIAGNOSIS — H40.1131: ICD-10-CM

## 2025-02-10 PROCEDURE — 99213 OFFICE O/P EST LOW 20 MIN: CPT | Mod: 25 | Performed by: OPHTHALMOLOGY

## 2025-02-10 PROCEDURE — 92134 CPTRZ OPH DX IMG PST SGM RTA: CPT | Performed by: OPHTHALMOLOGY

## 2025-02-10 PROCEDURE — 67028 INJECTION EYE DRUG: CPT | Mod: RT | Performed by: OPHTHALMOLOGY

## 2025-02-10 ASSESSMENT — REFRACTION_CURRENTRX
OD_CYLINDER: -1.25
OD_AXIS: 105
OD_OVR_VA: 20/
OS_ADD: +3.00
OS_AXIS: 77
OD_ADD: +3.00
OD_SPHERE: +0.25
OS_OVR_VA: 20/
OS_SPHERE: -1.25
OS_CYLINDER: -0.75

## 2025-02-10 ASSESSMENT — REFRACTION_MANIFEST
OD_CYLINDER: -1.25
OS_AXIS: 80
OD_SPHERE: PL
OD_AXIS: 105
OS_ADD: +3.00
OD_ADD: +3.00
OD_VA1: 20/30
OS_SPHERE: -1.25
OS_CYLINDER: -1.00
OS_VA1: 20/30

## 2025-02-10 ASSESSMENT — KERATOMETRY
OD_AXISANGLE_DEGREES: 34
OS_K1POWER_DIOPTERS: 46.25
OD_K2POWER_DIOPTERS: 47.00
OS_AXISANGLE_DEGREES: 134
OD_K1POWER_DIOPTERS: 45.75
OS_K2POWER_DIOPTERS: 47.25

## 2025-02-10 ASSESSMENT — CONFRONTATIONAL VISUAL FIELD TEST (CVF)
OS_FINDINGS: FULL
OD_FINDINGS: FULL

## 2025-02-10 ASSESSMENT — VISUAL ACUITY
OD_BCVA: 20/70-
OS_BCVA: 20/70

## 2025-02-10 ASSESSMENT — REFRACTION_AUTOREFRACTION
OS_CYLINDER: -1.00
OS_AXIS: 51
OD_AXIS: 105
OD_CYLINDER: -1.25
OD_SPHERE: -0.75
OS_SPHERE: -1.25

## 2025-04-07 ENCOUNTER — OFFICE (OUTPATIENT)
Dept: URBAN - METROPOLITAN AREA CLINIC 77 | Facility: CLINIC | Age: 80
Setting detail: OPHTHALMOLOGY
End: 2025-04-07
Payer: MEDICARE

## 2025-04-07 DIAGNOSIS — H40.1131: ICD-10-CM

## 2025-04-07 DIAGNOSIS — H35.3231: ICD-10-CM

## 2025-04-07 DIAGNOSIS — H35.3211: ICD-10-CM

## 2025-04-07 PROCEDURE — 92134 CPTRZ OPH DX IMG PST SGM RTA: CPT | Performed by: OPHTHALMOLOGY

## 2025-04-07 PROCEDURE — 67028 INJECTION EYE DRUG: CPT | Mod: RT | Performed by: OPHTHALMOLOGY

## 2025-04-07 PROCEDURE — 99213 OFFICE O/P EST LOW 20 MIN: CPT | Mod: 25 | Performed by: OPHTHALMOLOGY

## 2025-04-07 ASSESSMENT — REFRACTION_AUTOREFRACTION
OD_SPHERE: -0.75
OS_SPHERE: -1.25
OS_AXIS: 51
OD_AXIS: 105
OS_CYLINDER: -1.00
OD_CYLINDER: -1.25

## 2025-04-07 ASSESSMENT — REFRACTION_CURRENTRX
OD_AXIS: 105
OS_SPHERE: -1.25
OS_ADD: +3.00
OS_OVR_VA: 20/
OS_AXIS: 77
OD_OVR_VA: 20/
OD_SPHERE: +0.25
OD_CYLINDER: -1.25
OD_ADD: +3.00
OS_CYLINDER: -0.75

## 2025-04-07 ASSESSMENT — KERATOMETRY
OD_AXISANGLE_DEGREES: 34
OD_K1POWER_DIOPTERS: 45.75
OS_K1POWER_DIOPTERS: 46.25
OS_AXISANGLE_DEGREES: 134
OS_K2POWER_DIOPTERS: 47.25
OD_K2POWER_DIOPTERS: 47.00

## 2025-04-07 ASSESSMENT — CONFRONTATIONAL VISUAL FIELD TEST (CVF)
OD_FINDINGS: FULL
OS_FINDINGS: FULL

## 2025-04-07 ASSESSMENT — REFRACTION_MANIFEST
OD_CYLINDER: -1.25
OS_SPHERE: -1.25
OS_CYLINDER: -1.00
OS_ADD: +3.00
OD_AXIS: 105
OD_SPHERE: PL
OD_ADD: +3.00
OD_VA1: 20/30
OS_VA1: 20/30
OS_AXIS: 80

## 2025-04-07 ASSESSMENT — VISUAL ACUITY
OS_BCVA: 20/70
OD_BCVA: 20/60

## 2025-06-04 ENCOUNTER — OFFICE (OUTPATIENT)
Dept: URBAN - METROPOLITAN AREA CLINIC 77 | Facility: CLINIC | Age: 80
Setting detail: OPHTHALMOLOGY
End: 2025-06-04
Payer: MEDICARE

## 2025-06-04 DIAGNOSIS — H35.3231: ICD-10-CM

## 2025-06-04 DIAGNOSIS — H40.1131: ICD-10-CM

## 2025-06-04 PROCEDURE — 92137 CPTRZ OPH IMG PST SG RTA OCT: CPT | Performed by: OPHTHALMOLOGY

## 2025-06-04 PROCEDURE — 67028 INJECTION EYE DRUG: CPT | Mod: 50 | Performed by: OPHTHALMOLOGY

## 2025-06-04 PROCEDURE — 99213 OFFICE O/P EST LOW 20 MIN: CPT | Mod: 25 | Performed by: OPHTHALMOLOGY

## 2025-06-04 ASSESSMENT — VISUAL ACUITY
OD_BCVA: 20/60-
OS_BCVA: 20/50

## 2025-06-04 ASSESSMENT — REFRACTION_CURRENTRX
OS_OVR_VA: 20/
OD_OVR_VA: 20/
OS_SPHERE: -1.25
OD_CYLINDER: -1.25
OD_ADD: +3.00
OS_ADD: +3.00
OS_AXIS: 77
OD_SPHERE: +0.25
OS_CYLINDER: -0.75
OD_AXIS: 105

## 2025-06-04 ASSESSMENT — CONFRONTATIONAL VISUAL FIELD TEST (CVF)
OD_FINDINGS: FULL
OS_FINDINGS: FULL

## 2025-06-04 ASSESSMENT — REFRACTION_AUTOREFRACTION
OS_AXIS: 51
OD_CYLINDER: -1.25
OS_CYLINDER: -1.00
OS_SPHERE: -1.25
OD_SPHERE: -0.75
OD_AXIS: 105

## 2025-06-04 ASSESSMENT — REFRACTION_MANIFEST
OD_SPHERE: PL
OD_AXIS: 105
OS_ADD: +3.00
OS_AXIS: 80
OS_VA1: 20/30
OD_ADD: +3.00
OD_VA1: 20/30
OS_SPHERE: -1.25
OD_CYLINDER: -1.25
OS_CYLINDER: -1.00

## 2025-06-04 ASSESSMENT — KERATOMETRY
OS_AXISANGLE_DEGREES: 134
OD_K1POWER_DIOPTERS: 45.75
OD_AXISANGLE_DEGREES: 34
OD_K2POWER_DIOPTERS: 47.00
OS_K1POWER_DIOPTERS: 46.25
OS_K2POWER_DIOPTERS: 47.25

## 2025-07-03 ENCOUNTER — NON-APPOINTMENT (OUTPATIENT)
Age: 80
End: 2025-07-03

## 2025-07-03 ENCOUNTER — APPOINTMENT (OUTPATIENT)
Dept: INTERNAL MEDICINE | Facility: CLINIC | Age: 80
End: 2025-07-03
Payer: MEDICARE

## 2025-07-03 VITALS
WEIGHT: 159 LBS | TEMPERATURE: 97.6 F | DIASTOLIC BLOOD PRESSURE: 82 MMHG | OXYGEN SATURATION: 97 % | HEIGHT: 61 IN | SYSTOLIC BLOOD PRESSURE: 124 MMHG | BODY MASS INDEX: 30.02 KG/M2 | HEART RATE: 90 BPM | RESPIRATION RATE: 18 BRPM

## 2025-07-03 PROBLEM — H25.9: Status: ACTIVE | Noted: 2025-07-03

## 2025-07-03 PROCEDURE — 93000 ELECTROCARDIOGRAM COMPLETE: CPT

## 2025-07-03 PROCEDURE — 99214 OFFICE O/P EST MOD 30 MIN: CPT

## 2025-07-03 PROCEDURE — G2211 COMPLEX E/M VISIT ADD ON: CPT

## 2025-07-22 ENCOUNTER — OFFICE (OUTPATIENT)
Dept: URBAN - METROPOLITAN AREA CLINIC 77 | Facility: CLINIC | Age: 80
Setting detail: OPHTHALMOLOGY
End: 2025-07-22
Payer: MEDICARE

## 2025-07-22 DIAGNOSIS — H35.3211: ICD-10-CM

## 2025-07-22 DIAGNOSIS — H59.022: ICD-10-CM

## 2025-07-22 DIAGNOSIS — H43.02: ICD-10-CM

## 2025-07-22 DIAGNOSIS — H35.3221: ICD-10-CM

## 2025-07-22 PROBLEM — H25.11 CATARACT SENILE NUCLEAR SCLEROSIS;  , RIGHT EYE: Status: ACTIVE | Noted: 2025-07-22

## 2025-07-22 PROCEDURE — 92250 FUNDUS PHOTOGRAPHY W/I&R: CPT | Performed by: OPHTHALMOLOGY

## 2025-07-22 PROCEDURE — 99215 OFFICE O/P EST HI 40 MIN: CPT | Performed by: OPHTHALMOLOGY

## 2025-07-22 ASSESSMENT — CORNEAL EDEMA - FOLDS/STRIAE: OS_FOLDSSTRIAE: 1+

## 2025-07-22 ASSESSMENT — CONFRONTATIONAL VISUAL FIELD TEST (CVF)
OS_FINDINGS: FULL
OD_FINDINGS: FULL

## 2025-07-23 ASSESSMENT — REFRACTION_AUTOREFRACTION
OD_CYLINDER: -1.25
OD_AXIS: 105
OS_CYLINDER: -1.00
OD_SPHERE: -0.75
OS_SPHERE: -1.25
OS_AXIS: 51

## 2025-07-23 ASSESSMENT — REFRACTION_MANIFEST
OD_AXIS: 105
OS_VA1: 20/30
OS_AXIS: 80
OD_SPHERE: PL
OD_CYLINDER: -1.25
OD_ADD: +3.00
OS_ADD: +3.00
OD_VA1: 20/30
OS_SPHERE: -1.25
OS_CYLINDER: -1.00

## 2025-07-23 ASSESSMENT — VISUAL ACUITY
OD_BCVA: 20/400
OS_BCVA: 20/60

## 2025-07-23 ASSESSMENT — REFRACTION_CURRENTRX
OS_ADD: +3.00
OS_OVR_VA: 20/
OD_OVR_VA: 20/
OS_SPHERE: -1.25
OS_CYLINDER: -0.75
OD_ADD: +3.00
OD_SPHERE: +0.25
OS_AXIS: 77
OD_CYLINDER: -1.25
OD_AXIS: 105

## 2025-07-23 ASSESSMENT — KERATOMETRY
OS_K1POWER_DIOPTERS: 46.25
OD_K1POWER_DIOPTERS: 45.75
OD_K2POWER_DIOPTERS: 47.00
OS_K2POWER_DIOPTERS: 47.25
OS_AXISANGLE_DEGREES: 134
OD_AXISANGLE_DEGREES: 34

## 2025-07-25 ENCOUNTER — OUTPATIENT HOSPITAL (OUTPATIENT)
Dept: URBAN - METROPOLITAN AREA CLINIC 78 | Facility: CLINIC | Age: 80
Setting detail: OPHTHALMOLOGY
End: 2025-07-25
Payer: MEDICARE

## 2025-07-25 DIAGNOSIS — H43.02: ICD-10-CM

## 2025-07-25 DIAGNOSIS — H59.022: ICD-10-CM

## 2025-07-25 PROCEDURE — 67036 REMOVAL OF INNER EYE FLUID: CPT | Mod: 59,LT | Performed by: OPHTHALMOLOGY

## 2025-07-25 PROCEDURE — 66850 REMOVAL OF LENS MATERIAL: CPT | Mod: 59,LT | Performed by: OPHTHALMOLOGY

## 2025-08-11 ENCOUNTER — RX ONLY (RX ONLY)
Age: 80
End: 2025-08-11

## 2025-08-11 ENCOUNTER — OFFICE (OUTPATIENT)
Dept: URBAN - METROPOLITAN AREA CLINIC 77 | Facility: CLINIC | Age: 80
Setting detail: OPHTHALMOLOGY
End: 2025-08-11
Payer: MEDICARE

## 2025-08-11 DIAGNOSIS — H40.1131: ICD-10-CM

## 2025-08-11 DIAGNOSIS — H35.3231: ICD-10-CM

## 2025-08-11 DIAGNOSIS — H35.3211: ICD-10-CM

## 2025-08-11 PROCEDURE — 99213 OFFICE O/P EST LOW 20 MIN: CPT | Mod: 24,25 | Performed by: OPHTHALMOLOGY

## 2025-08-11 PROCEDURE — 92134 CPTRZ OPH DX IMG PST SGM RTA: CPT | Performed by: OPHTHALMOLOGY

## 2025-08-11 PROCEDURE — 67028 INJECTION EYE DRUG: CPT | Mod: 79,RT | Performed by: OPHTHALMOLOGY

## 2025-08-11 ASSESSMENT — REFRACTION_CURRENTRX
OS_AXIS: 77
OS_OVR_VA: 20/
OD_CYLINDER: -1.25
OS_CYLINDER: -0.75
OD_AXIS: 105
OS_ADD: +3.00
OD_OVR_VA: 20/
OD_SPHERE: +0.25
OS_SPHERE: -1.25
OD_ADD: +3.00

## 2025-08-11 ASSESSMENT — REFRACTION_AUTOREFRACTION
OS_AXIS: 51
OD_AXIS: 105
OD_SPHERE: -0.75
OS_CYLINDER: -1.00
OD_CYLINDER: -1.25
OS_SPHERE: -1.25

## 2025-08-11 ASSESSMENT — REFRACTION_MANIFEST
OS_ADD: +3.00
OS_AXIS: 80
OS_SPHERE: -1.25
OD_AXIS: 105
OD_ADD: +3.00
OS_VA1: 20/30
OD_VA1: 20/30
OS_CYLINDER: -1.00
OD_SPHERE: PL
OD_CYLINDER: -1.25

## 2025-08-11 ASSESSMENT — KERATOMETRY
OS_K2POWER_DIOPTERS: 47.25
OS_K1POWER_DIOPTERS: 46.25
OD_K2POWER_DIOPTERS: 47.00
OS_AXISANGLE_DEGREES: 134
OD_K1POWER_DIOPTERS: 45.75
OD_AXISANGLE_DEGREES: 34

## 2025-08-11 ASSESSMENT — VISUAL ACUITY
OS_BCVA: 20/70-2
OD_BCVA: 20/100-2

## 2025-08-11 ASSESSMENT — CORNEAL EDEMA - FOLDS/STRIAE: OS_FOLDSSTRIAE: 1+

## 2025-08-11 ASSESSMENT — CONFRONTATIONAL VISUAL FIELD TEST (CVF)
OD_FINDINGS: FULL
OS_FINDINGS: FULL